# Patient Record
Sex: MALE | Race: WHITE | NOT HISPANIC OR LATINO | Employment: FULL TIME | URBAN - METROPOLITAN AREA
[De-identification: names, ages, dates, MRNs, and addresses within clinical notes are randomized per-mention and may not be internally consistent; named-entity substitution may affect disease eponyms.]

---

## 2017-04-12 ENCOUNTER — APPOINTMENT (OUTPATIENT)
Dept: GENERAL RADIOLOGY | Facility: CLINIC | Age: 52
End: 2017-04-12
Attending: EMERGENCY MEDICINE

## 2017-04-12 ENCOUNTER — HOSPITAL ENCOUNTER (EMERGENCY)
Facility: CLINIC | Age: 52
Discharge: HOME | End: 2017-04-12
Attending: EMERGENCY MEDICINE | Admitting: EMERGENCY MEDICINE

## 2017-04-12 VITALS
SYSTOLIC BLOOD PRESSURE: 127 MMHG | RESPIRATION RATE: 18 BRPM | DIASTOLIC BLOOD PRESSURE: 79 MMHG | TEMPERATURE: 98.2 F | HEART RATE: 79 BPM | OXYGEN SATURATION: 97 %

## 2017-04-12 DIAGNOSIS — M23.91 INTERNAL DERANGEMENT OF RIGHT KNEE: ICD-10-CM

## 2017-04-12 PROCEDURE — 99284 EMERGENCY DEPT VISIT MOD MDM: CPT | Mod: 25

## 2017-04-12 PROCEDURE — 25000132 ZZH RX MED GY IP 250 OP 250 PS 637: Performed by: EMERGENCY MEDICINE

## 2017-04-12 PROCEDURE — 99284 EMERGENCY DEPT VISIT MOD MDM: CPT | Mod: Z6 | Performed by: EMERGENCY MEDICINE

## 2017-04-12 PROCEDURE — 73560 X-RAY EXAM OF KNEE 1 OR 2: CPT | Mod: RT

## 2017-04-12 PROCEDURE — 29505 APPLICATION LONG LEG SPLINT: CPT | Mod: RT

## 2017-04-12 RX ORDER — HYDROCODONE BITARTRATE AND ACETAMINOPHEN 5; 325 MG/1; MG/1
1-2 TABLET ORAL EVERY 6 HOURS PRN
Qty: 15 TABLET | Refills: 0 | Status: SHIPPED | OUTPATIENT
Start: 2017-04-12 | End: 2022-06-16

## 2017-04-12 RX ORDER — HYDROCODONE BITARTRATE AND ACETAMINOPHEN 5; 325 MG/1; MG/1
2 TABLET ORAL ONCE
Status: COMPLETED | OUTPATIENT
Start: 2017-04-12 | End: 2017-04-12

## 2017-04-12 RX ADMIN — HYDROCODONE BITARTRATE AND ACETAMINOPHEN 2 TABLET: 5; 325 TABLET ORAL at 22:08

## 2017-04-12 ASSESSMENT — ENCOUNTER SYMPTOMS
SHORTNESS OF BREATH: 0
FEVER: 0
ABDOMINAL PAIN: 0

## 2017-04-12 NOTE — ED AVS SNAPSHOT
[facility], Emergency Department    [address]    [facility] [address]    Phone:  [phone]    Fax:  [phone]                                       Alvarez Subramanian   MRN: [id number]    Department:  [facility], Emergency Department   Date of Visit:  4/12/2017           Patient Information     Date Of Birth          1965        Your diagnoses for this visit were:     Internal derangement of right knee        You were seen by Patrica Montgomery MD.        Discharge Instructions         How Your Knee Works  A healthy knee bends easily and rotates slightly. The joint absorbs stress and moves smoothly. This allows you to walk, squat, and turn without pain.    A healthy knee  The knee is a hinge joint, formed where the thighbone (femur) and the shinbone (tibia) meet. It is the largest joint in the body. The joint is covered with smooth tissue and powered by large muscles. When all the parts listed below are healthy, a knee should move easily:    Cartilage is a layer of smooth tissue. It covers the ends of the thighbone and shinbone. It also lines the back side of the kneecap. Healthy cartilage absorbs stress and allows the knee to bend easily.    Muscles power the knee and leg for movement.    Tendons attach the muscles to the bones.    Ligaments are bands of tissue that connect bones and brace the joint.    Bones that make up your knee joint include your thighbone (femur), shinbone (tibia), and kneecap (patella).    Menisci are 2 wedge shaped pieces of cartilage that absorb shock between the thighbone and shinbone.    8766-2934 [email or web address]. [address] [place] [address], [place] [address]. All rights reserved. This information is not intended as a substitute for professional medical care. Always follow your healthcare professional's instructions.      Please make an appointment to follow up with Orthopedics (phone: [phone])within a week.       24 Hour Appointment Hotline        To make an appointment at any [facility] clinic, call [phone] ([phone]). If you don't have a family doctor or clinic, we will help you find one. [facility] clinics are conveniently located to serve the needs of you and your family.          ED Discharge Orders     Crutches       Use gait belt during crutch training.            Knee immobilizer                    Review of your medicines      START taking        Dose / Directions Last dose taken    HYDROcodone-acetaminophen 5-325 MG per tablet   Commonly known as:  NORCO   Dose:  1-2 tablet   Quantity:  15 tablet        Take 1-2 tablets by mouth every 6 hours as needed for moderate to severe pain   Refills:  0          Our records show that you are taking the medicines listed below. If these are incorrect, please call your family doctor or clinic.        Dose / Directions Last dose taken    IBUPROFEN PO        Refills:  0        SYNTHROID PO   Dose:  175 mcg        Take 175 mcg by mouth   Refills:  0                Prescriptions were sent or printed at these locations (1 Prescription)                   Other Prescriptions                Printed at Department/Unit printer (1 of 1)         HYDROcodone-acetaminophen (NORCO) 5-325 MG per tablet                Procedures and tests performed during your visit     Knee XR, 1-2 views, right      Orders Needing Specimen Collection     None      Pending Results     No orders found from 4/10/2017 to 4/13/2017.            Pending Culture Results     No orders found from 4/10/2017 to 4/13/2017.            Thank you for choosing [facility]       Thank you for choosing [facility] for your care. Our goal is always to provide you with excellent care. Hearing back from our patients is one way we can continue to improve our services. Please take a few minutes to complete the written survey that you may receive in the mail after you visit with us. Thank you!        [employer] Information     [employer] lets you send messages to your  "doctor, view your test results, renew your prescriptions, schedule appointments and more. To sign up, go to [email or web address] . Click on \"Log in\" on the left side of the screen, which will take you to the Welcome page. Then click on \"Sign up Now\" on the right side of the page.     You will be asked to enter the access code listed below, as well as some personal information. Please follow the directions to create your username and password.     Your access code is: [email or web address]  Expires: 2017 10:13 PM     Your access code will  in 90 days. If you need help or a new code, please call your [place] clinic or [phone].        Care EveryWhere ID     This is your Care EveryWhere ID. This could be used by other organizations to access your [place] medical records  [id number]        After Visit Summary       This is your record. Keep this with you and show to your community pharmacist(s) and doctor(s) at your next visit.                  "

## 2017-04-13 ENCOUNTER — OFFICE VISIT (OUTPATIENT)
Dept: ORTHOPEDICS | Facility: CLINIC | Age: 52
End: 2017-04-13

## 2017-04-13 VITALS — HEIGHT: 68 IN | WEIGHT: 212 LBS | BODY MASS INDEX: 32.13 KG/M2

## 2017-04-13 DIAGNOSIS — M25.561 ACUTE PAIN OF RIGHT KNEE: Primary | ICD-10-CM

## 2017-04-13 NOTE — DISCHARGE INSTRUCTIONS
How Your Knee Works  A healthy knee bends easily and rotates slightly. The joint absorbs stress and moves smoothly. This allows you to walk, squat, and turn without pain.    A healthy knee  The knee is a hinge joint, formed where the thighbone (femur) and the shinbone (tibia) meet. It is the largest joint in the body. The joint is covered with smooth tissue and powered by large muscles. When all the parts listed below are healthy, a knee should move easily:    Cartilage is a layer of smooth tissue. It covers the ends of the thighbone and shinbone. It also lines the back side of the kneecap. Healthy cartilage absorbs stress and allows the knee to bend easily.    Muscles power the knee and leg for movement.    Tendons attach the muscles to the bones.    Ligaments are bands of tissue that connect bones and brace the joint.    Bones that make up your knee joint include your thighbone (femur), shinbone (tibia), and kneecap (patella).    Menisci are 2 wedge shaped pieces of cartilage that absorb shock between the thighbone and shinbone.    0406-0688 [employer]. [address] [place] [address]. All rights reserved. This information is not intended as a substitute for professional medical care. Always follow your healthcare professional's instructions.      Please make an appointment to follow up with Orthopedics (phone: [phone])within a week.

## 2017-04-13 NOTE — MR AVS SNAPSHOT
After Visit Summary   4/13/2017    Alvarez Subramanian    MRN: [id number]           Patient Information     Date Of Birth          1965        Visit Information        Provider Department      4/13/2017 2:00 PM Kina Hinojosa MD [facility] Sports Medicine        Today's Diagnoses     Acute pain of right knee    -  1       Follow-ups after your visit        Follow-up notes from your care team     Return for MRI review.      Your next 10 appointments already scheduled     Apr 18, 2017  7:45 PM CDT   (Arrive by 7:30 PM)   MR LOWER EXTREMITY JOINT RIGHT W/O CONTRAST with [id number]   [facility] [place] [facility] ([facility])    [address]  1st Floor  [facility] [address] [phone]           Take your medicines as usual, unless your doctor tells you not to. Bring a list of your current medicines to your exam (including vitamins, minerals and over-the-counter drugs). Also bring the results of similar scans you may have had.  Please remove any body piercings and hair extensions before you arrive.  Follow your doctor s orders. If you do not, we may have to postpone your exam.  You will not have contrast for this exam. You do not need to do anything special to prepare.  The MRI machine uses a strong magnet. Please wear clothes without metal (snaps, zippers). A sweatsuit works well, or we may give you a hospital gown.   **IMPORTANT** THE INSTRUCTIONS BELOW ARE ONLY FOR THOSE PATIENTS WHO HAVE BEEN TOLD THEY WILL RECEIVE SEDATION OR GENERAL ANESTHESIA DURING THEIR MRI PROCEDURE:  IF YOU WILL RECEIVE SEDATION (take medicine to help you relax during your exam):   You must get the medicine from your doctor before you arrive. Bring the medicine to the exam. Do not take it at home.   Arrive one hour early. Bring someone who can take you home after the test. Your medicine will make you sleepy. After the exam, you may not drive, take a bus or take a taxi by yourself.   No  eating 8 hours before your exam. You may have clear liquids up until 4 hours before your exam. (Clear liquids include water, clear tea, black coffee and fruit juice without pulp.)  IF YOU WILL RECEIVE ANESTHESIA (be asleep for your exam):   Arrive 1 1/2 hours early. Bring someone who can take you home after the test. You may not drive, take a bus or take a taxi by yourself.   No eating 8 hours before your exam. You may have clear liquids up until 4 hours before your exam. (Clear liquids include water, clear tea, black coffee and fruit juice without pulp.)   You will spend four to five hours in the recovery room.  Please call the Imaging Department at your exam site with any questions.              Future tests that were ordered for you today     Open Future Orders        Priority Expected Expires Ordered    MRI Lower extremity joint w/o contrast rt* Routine  4/13/2018 4/13/2017            Who to contact     Please call your clinic at [phone] to:    Ask questions about your health    Make or cancel appointments    Discuss your medicines    Learn about your test results    Speak to your doctor   If you have compliments or concerns about an experience at your clinic, or if you wish to file a complaint, please contact [facility] Patient Relations at [phone] or email us at [email or web address]         Additional Information About Your Visit        [employer] Information     [employer] is an electronic gateway that provides easy, online access to your medical records. With [employer], you can request a clinic appointment, read your test results, renew a prescription or communicate with your care team.     To sign up for [employer] visit the website at [email or web address]   You will be asked to enter the access code listed below, as well as some personal information. Please follow the directions to create your username and password.     Your access code is: [email or web address]  Expires:  "2017 10:13 PM     Your access code will  in 90 days. If you need help or a new code, please contact your [facility] or call [phone] for assistance.        Care EveryWhere ID     This is your Care EveryWhere ID. This could be used by other organizations to access your [place] medical records  [id number]        Your Vitals Were     Height BMI (Body Mass Index)                5' 8\" (1.727 m) 32.23 kg/m2           Blood Pressure from Last 3 Encounters:   17 127/79    Weight from Last 3 Encounters:   17 212 lb (96.2 kg)               Primary Care Provider Office Phone # Fax #    [facility] [phone]       [address] [place] [address] [address]  [facility] [address]        Thank you!     Thank you for choosing [facility]  for your care. Our goal is always to provide you with excellent care. Hearing back from our patients is one way we can continue to improve our services. Please take a few minutes to complete the written survey that you may receive in the mail after your visit with us. Thank you!             Your Updated Medication List - Protect others around you: Learn how to safely use, store and throw away your medicines at www.disposemymeds.org.          This list is accurate as of: 17  2:37 PM.  Always use your most recent med list.                   Brand Name Dispense Instructions for use    HYDROcodone-acetaminophen 5-325 MG per tablet    NORCO    15 tablet    Take 1-2 tablets by mouth every 6 hours as needed for moderate to severe pain       IBUPROFEN PO          SYNTHROID PO      Take 175 mcg by mouth         "

## 2017-04-13 NOTE — PROGRESS NOTES
Attending Note:   I have personally examined this patient and have reviewed the clinical presentation and progress note with the fellow. I agree with the treatment plan as outlined. The plan was formulated with the fellow on the day of the patient's visit. I have reviewed all imaging with the fellow and agree with the findings in the documentation.     Maricarmen Daniels MD, CAQ, CCD  [facility]  Sports Medicine and Bone Health

## 2017-04-13 NOTE — PROGRESS NOTES
" Subjective:   Alvarez Subramanian is a 52 year old male who is here for right knee pain.  He states that 3 days ago on Monday he was standing on a ladder about 2 feet off the ground)  when his foot slipped and he fell.  His right leg twisted outward during the fall.  He had significant medial knee pain and developed an effusion over the next 2 hours.  His pain is worst with twisting.  He has taken ibuprofen and Norco for pain.  He is wearing his immobilizer, using crutches, and icing/ elevating his knee..  He is unable to bear weight on his knee currently.  He denies any previous problems with his knees.  He works as an independent .  Denies numbness or tingling.      Background:   Date of injury: 4/10/17   Duration of symptoms: 3 days  Mechanism of Injury: Acute; fall on ladder, knee twisted    Aggravating factors: twisting, weight bearing  Relieving Factors: rest and ice  Prior Evaluation: Prior Physician Evalutation: ED and X-rays    PAST MEDICAL, SOCIAL, SURGICAL AND FAMILY HISTORY: He  has no past medical history on file.  He  has no past surgical history on file.  His family history is not on file.  He     ALLERGIES: He has No Known Allergies.    CURRENT MEDICATIONS: He has a current medication list which includes the following prescription(s): ibuprofen, levothyroxine sodium, and hydrocodone-acetaminophen.     REVIEW OF SYSTEMS:  CONSTITUTIONAL:NEGATIVE for fever, chills, change in weight  INTEGUMENTARY/SKIN: NEGATIVE for worrisome rashes, moles or lesions  MUSCULOSKELETAL:See HPI above  NEURO: NEGATIVE for weakness, dizziness or paresthesias  All other systems reviewed and negative.       Exam:   Ht 5' 8\" (1.727 m)  Wt 212 lb (96.2 kg)  BMI 32.23 kg/m2  CONSTITUTIONIAL: healthy, alert and no distress  HEENT: NC/AT, no scleral icterus  SKIN: no suspicious lesions or rashes  GAIT: normal  CARDIO: no LE edema  LUNGS: breathing non labored  ABD: soft, non obese  NEUROLOGIC: No focal neuro " deficits  PSYCHIATRIC: affect normal/bright and mentation appears normal    MUSCULOSKELETAL:   R knee: significant effusion, no erythema or bruising, medial joint line pain, ROM 5-90, pain with flexion, mcmurrays reproduces medial joint line pain, unable to get a good end point on lachmans secondary to guarding, MCL stress increases pain without opening, negative LCL stress testing      KNEE RIGHT ONE - TWO VIEW 4/12/2017 9:38 PM      HISTORY: Pain, trauma.         IMPRESSION: Joint effusion. No fracture identified.     REGINALD BLACKBURN MD     Assessment/Plan:   Right knee pain  Internal Derangement, Effusion  -concern for ACL, medial meniscus  -MRI to further evaluate  -hinged knee brace, recommended knee ROM  -effusion control with icing, elevating  -some weight bearing with crutches until MRI review    F/u after MRI to review results    Patient seen and examined with attending physician, Dr. Daniels.    Kina Hinojosa DO  Primary Care Sports Medicine Fellow

## 2017-04-13 NOTE — ED PROVIDER NOTES
[facility] [place] [facility]    April 12, 2017    ED 19  History     Chief Complaint   Patient presents with     Knee Pain     two days ago foot slipped off the second rung of a ladder and right foot hit grass and foot moved outward. Foot is fine but medial aspect of right knee hurt a great deal at the time; denies hitting head and fell against ladder.     The history is provided by the patient.     Alvarez Subramanian is a 52 year old male who presents with knee pain. The patient reports that he was standing about 2 feet up on a ladder when his foot slipped and he fell on Monday, 2 days ago. He landed on the right leg, and notes that his right foot slipped and the lower leg twisted outward while the upper leg was straight. He complains of pain in the right knee since the fall, and developed significant swelling in the knee over the next 2-3 hours the fall. He complains of right knee swelling and pain in the right medial knee currently. He has been taking Tylenol and ibuprofen for the pain, as well as applying ice. He has been unable to ambulate on the right leg since the fall. He denies hip pain or any other injuries as a result of the fall.     No past medical history on file.    No past surgical history on file.    No family history on file.    Social History   Substance Use Topics     Smoking status: Not on file     Smokeless tobacco: Not on file     Alcohol use Not on file     No current facility-administered medications for this encounter.      Current Outpatient Prescriptions   Medication     IBUPROFEN PO     Levothyroxine Sodium (SYNTHROID PO)     HYDROcodone-acetaminophen (NORCO) 5-325 MG per tablet      No Known Allergies     I have reviewed the Medications, Allergies, Past Medical and Surgical History, and Social History in the Epic system.    Review of Systems   Constitutional: Negative for fever.   Respiratory: Negative for shortness of breath.    Cardiovascular: Negative for chest  pain.   Gastrointestinal: Negative for abdominal pain.   Musculoskeletal:        Positive for right knee pain and swelling. Negative for hip pain.   All other systems reviewed and are negative.      Physical Exam   BP: 114/72  Pulse: 79  Temp: 99.1  F (37.3  C)  Resp: 16  SpO2: 95 %  Physical Exam   Constitutional: No distress.   HENT:   Head: Atraumatic.   Eyes: No scleral icterus.   Cardiovascular: Normal heart sounds and intact distal pulses.    Pulmonary/Chest: Breath sounds normal. No respiratory distress.   Musculoskeletal: He exhibits tenderness. He exhibits no edema.        Legs:  Skin: Skin is warm. No rash noted. He is not diaphoretic.       ED Course     ED Course     Procedures     9:05 PM  The patient was seen and examined by Dr. Montgomery in Room 19.               Critical Care time:  none               Labs Ordered and Resulted from Time of ED Arrival Up to the Time of Departure from the ED - No data to display    Assessments & Plan (with Medical Decision Making)   Patient does have a notable effusion.  He has no significant tenderness on palpation, though given the significant effusion and the inability to bear weight, x-ray was done.  This does not show any bony abnormality.  He does have a lot of medial discomfort with medial stress.  I do think he likely has internal derangement of the knee.  He was given a small number of Vicodin to be used as needed for pain, as well as crutches and an immobilizer.  He is instructed to follow up with Orthopedics or primary care physician. Patient verbalizes understanding and is agreeable to plan.    This part of the document was transcribed by Williams Julien Scribe.      I have reviewed the nursing notes.    I have reviewed the findings, diagnosis, plan and need for follow up with the patient.    Discharge Medication List as of 4/12/2017 10:13 PM      START taking these medications    Details   HYDROcodone-acetaminophen (NORCO) 5-325 MG per tablet Take 1-2  tablets by mouth every 6 hours as needed for moderate to severe pain, Disp-15 tablet, R-0, Local Print             Final diagnoses:   Internal derangement of right knee     I, Ze Lim, am serving as a trained medical scribe to document services personally performed by Patrica Montgomery MD, based on the provider's statements to me.   IPatrica MD, was physically present and have reviewed and verified the accuracy of this note documented by Ze Lim.     4/12/2017   [facility], [place], EMERGENCY DEPARTMENT     Patrica Montgomery MD  04/12/17 0962

## 2017-04-13 NOTE — LETTER
4/13/2017      RE: Alavrez Subramanian  [address] [address]       Attending Note:   I have personally examined this patient and have reviewed the clinical presentation and progress note with the fellow. I agree with the treatment plan as outlined. The plan was formulated with the fellow on the day of the patient's visit. I have reviewed all imaging with the fellow and agree with the findings in the documentation.     Maricarmen Daniels MD, CAQ, CCD  [facility]  Sports Medicine and Bone Health     Subjective:   Alvarez Subramanian is a 52 year old male who is here for right knee pain.  He states that 3 days ago on Monday he was standing on a ladder about 2 feet off the ground)  when his foot slipped and he fell.  His right leg twisted outward during the fall.  He had significant medial knee pain and developed an effusion over the next 2 hours.  His pain is worst with twisting.  He has taken ibuprofen and Norco for pain.  He is wearing his immobilizer, using crutches, and icing/ elevating his knee..  He is unable to bear weight on his knee currently.  He denies any previous problems with his knees.  He works as an independent .  Denies numbness or tingling.      Background:   Date of injury: 4/10/17   Duration of symptoms: 3 days  Mechanism of Injury: Acute; fall on ladder, knee twisted    Aggravating factors: twisting, weight bearing  Relieving Factors: rest and ice  Prior Evaluation: Prior Physician Evalutation: ED and X-rays    PAST MEDICAL, SOCIAL, SURGICAL AND FAMILY HISTORY: He  has no past medical history on file.  He  has no past surgical history on file.  His family history is not on file.  He     ALLERGIES: He has No Known Allergies.    CURRENT MEDICATIONS: He has a current medication list which includes the following prescription(s): ibuprofen, levothyroxine sodium, and hydrocodone-acetaminophen.     REVIEW OF SYSTEMS:  CONSTITUTIONAL:NEGATIVE for fever, chills, change in  "weight  INTEGUMENTARY/SKIN: NEGATIVE for worrisome rashes, moles or lesions  MUSCULOSKELETAL:See HPI above  NEURO: NEGATIVE for weakness, dizziness or paresthesias  All other systems reviewed and negative.       Exam:   Ht 5' 8\" (1.727 m)  Wt 212 lb (96.2 kg)  BMI 32.23 kg/m2  CONSTITUTIONIAL: healthy, alert and no distress  HEENT: NC/AT, no scleral icterus  SKIN: no suspicious lesions or rashes  GAIT: normal  CARDIO: no LE edema  LUNGS: breathing non labored  ABD: soft, non obese  NEUROLOGIC: No focal neuro deficits  PSYCHIATRIC: affect normal/bright and mentation appears normal    MUSCULOSKELETAL:   R knee: significant effusion, no erythema or bruising, medial joint line pain, ROM 5-90, pain with flexion, mcmurrays reproduces medial joint line pain, unable to get a good end point on lachmans secondary to guarding, MCL stress increases pain without opening, negative LCL stress testing      KNEE RIGHT ONE - TWO VIEW 4/12/2017 9:38 PM      HISTORY: Pain, trauma.         IMPRESSION: Joint effusion. No fracture identified.     REGINALD BLACKBURN MD     Assessment/Plan:   Right knee pain  Internal Derangement, Effusion  -concern for ACL, medial meniscus  -MRI to further evaluate  -hinged knee brace, recommended knee ROM  -effusion control with icing, elevating  -some weight bearing with crutches until MRI review    F/u after MRI to review results    Patient seen and examined with attending physician, Dr. Daniels.    Kina Hinojosa DO  Primary Care Sports Medicine Fellow        "

## 2021-06-21 NOTE — ED AVS SNAPSHOT
[facility], [place], Emergency Department    [address]    [facility] [address]    Phone:  [phone]    Fax:  [phone]                                       Alvarez Subramanian   MRN: [id number]    Department:  [facility], Emergency Department   Date of Visit:  4/12/2017           After Visit Summary Signature Page     I have received my discharge instructions, and my questions have been answered. I have discussed any challenges I see with this plan with the nurse or doctor.    ..........................................................................................................................................  Patient/Patient Representative Signature      ..........................................................................................................................................  Patient Representative Print Name and Relationship to Patient    ..................................................               ................................................  Date                                            Time    ..........................................................................................................................................  Reviewed by Signature/Title    ...................................................              ..............................................  Date                                                            Time           yes

## 2022-06-15 ENCOUNTER — TELEPHONE (OUTPATIENT)
Dept: FAMILY MEDICINE | Facility: CLINIC | Age: 57
End: 2022-06-15
Payer: COMMERCIAL

## 2022-06-15 NOTE — TELEPHONE ENCOUNTER
This pt. Is on for new pt. establish care, video visit. Needs to be in-person visit. Please re-schedule or notify him to come in person whatever needed. Not appropriate for video visit.   MARTINA Thrasher CNP

## 2022-06-16 ENCOUNTER — OFFICE VISIT (OUTPATIENT)
Dept: FAMILY MEDICINE | Facility: CLINIC | Age: 57
End: 2022-06-16
Payer: COMMERCIAL

## 2022-06-16 DIAGNOSIS — N48.6 PEYRONIE DISEASE: Primary | ICD-10-CM

## 2022-06-16 DIAGNOSIS — R79.89 LOW TESTOSTERONE IN MALE: ICD-10-CM

## 2022-06-16 DIAGNOSIS — E03.9 HYPOTHYROIDISM, UNSPECIFIED TYPE: ICD-10-CM

## 2022-06-16 PROBLEM — E03.8 OTHER SPECIFIED HYPOTHYROIDISM: Status: ACTIVE | Noted: 2022-06-16

## 2022-06-16 PROCEDURE — 99214 OFFICE O/P EST MOD 30 MIN: CPT | Mod: TEL | Performed by: NURSE PRACTITIONER

## 2022-06-16 RX ORDER — PENTOXIFYLLINE 400 MG/1
400 TABLET, EXTENDED RELEASE ORAL
Qty: 90 TABLET | Refills: 0 | Status: SHIPPED | OUTPATIENT
Start: 2022-06-16 | End: 2022-07-06

## 2022-06-16 RX ORDER — THYROID 120 MG/1
120 TABLET ORAL DAILY
Qty: 30 TABLET | Refills: 0 | Status: SHIPPED | OUTPATIENT
Start: 2022-06-16 | End: 2022-07-06

## 2022-06-16 RX ORDER — LEVOTHYROXINE SODIUM 175 UG/1
175 TABLET ORAL DAILY
Qty: 90 TABLET | Refills: 0 | Status: SHIPPED | OUTPATIENT
Start: 2022-06-16 | End: 2022-06-16

## 2022-06-16 NOTE — PROGRESS NOTES
Assessment & Plan     Peyronie disease  On [place], needs f/u eval. 1 month given. Referral sent.   - pentoxifylline ER (TRENTAL) 400 MG CR tablet; Take 1 tablet (400 mg) by mouth 3 times daily (with meals)  - Adult Urology Referral; Future    Hypothyroidism, unspecified type  Likely stable, last labs in Nov.   Needs re-check. Pt. Will have labs with PE when established.   - thyroid (ARMOUR) 120 MG tablet; Take 1 tablet (120 mg) by mouth daily    Low testosterone in male  Urology eval needed, low energy.   - Adult Urology Referral; Future             MEDICATIONS:  Continue current medications without change    Return in about 4 weeks (around 7/14/2022) for Physical Exam.      Telephone visit 26 minutes.     MARTINA Thrasher CNP  [facility] JUSTIN Uribe is a 57 year old, presenting for the following health issues:  No chief complaint on file.  establish care. [facility] system, used for past 6 months.     TX for Peyronie's Syndrome with pentoxifylline TID, onset in November very painful.    Needs to have urology referral. HX of testosterone levels being low, had implant therapy in his buttocks.     Hypothyroidism- diagnosed 7 years ago. switched to [place] thyroid about 6 months ago, due to hair loss. This has helped, and hair loss is better, but low level fatigue.   Pt. Felt that Gatorade consumption/deyhydration caused the thyroid issues over time.     Past, social, family history reviewed.       significant other, has two children 26- son, daughter 18.     HPI           Review of Systems   Constitutional, HEENT, cardiovascular, pulmonary, gi and gu systems are negative, except as otherwise noted.      Objective    There were no vitals taken for this visit.  There is no height or weight on file to calculate BMI.  Physical Exam   healthy, alert and no distress  PSYCH: Alert and oriented times 3; coherent speech, normal   rate and volume, able to articulate logical thoughts,  able   to abstract reason, no tangential thoughts, no hallucinations   or delusions  Her affect is normal and pleasant  RESP: No cough, no audible wheezing, able to talk in full sentences  Remainder of exam unable to be completed due to telephone visits                   .  ..

## 2022-07-05 NOTE — PROGRESS NOTES
SUBJECTIVE:   CC: Alvarez Subramanian is an 57 year old male who presents for preventative health visit.       Patient has been advised of split billing requirements and indicates understanding: Yes  Healthy Habits:     Getting at least 3 servings of Calcium per day:  NO    Bi-annual eye exam:  NO    Dental care twice a year:  NO    Sleep apnea or symptoms of sleep apnea:  None    Diet:  Regular (no restrictions) and Breakfast skipped    Frequency of exercise:  None    Taking medications regularly:  Yes    Medication side effects:  None    PHQ-2 Total Score: 2    Additional concerns today:  No      Today's PHQ-2 Score:   PHQ-2 ( 1999 Pfizer) 7/6/2022   Q1: Little interest or pleasure in doing things 1   Q2: Feeling down, depressed or hopeless 1   PHQ-2 Score 2   Q1: Little interest or pleasure in doing things Several days   Q2: Feeling down, depressed or hopeless Several days   PHQ-2 Score 2       Abuse: Current or Past(Physical, Sexual or Emotional)- No  Do you feel safe in your environment? Yes    Have you ever done Advance Care Planning? (For example, a Health Directive, POLST, or a discussion with a medical provider or your loved ones about your wishes): No, advance care planning information given to patient to review.  Advanced care planning was discussed at today's visit.    Social History     Tobacco Use     Smoking status: Former Smoker     Packs/day: 1.00     Years: 10.00     Pack years: 10.00     Types: Cigarettes     Smokeless tobacco: Never Used   Substance Use Topics     Alcohol use: Yes     Comment: 2 drinks/week         Alcohol Use 7/6/2022   Prescreen: >3 drinks/day or >7 drinks/week? No       Last PSA: No results found for: PSA    Reviewed orders with patient. Reviewed health maintenance and updated orders accordingly - Yes  Current Outpatient Medications   Medication Sig Dispense Refill     aspirin (ASA) 81 MG chewable tablet Take 81 mg by mouth daily       IBUPROFEN PO        pentoxifylline ER  (TRENTAL) 400 MG CR tablet Take 1 tablet (400 mg) by mouth 3 times daily (with meals) 90 tablet 0     thyroid (ARMOUR) 120 MG tablet Take 1 tablet (120 mg) by mouth daily 30 tablet 0     No Known Allergies    Reviewed and updated as needed this visit by clinical staff   Tobacco  Allergies  Meds   Med Hx  Surg Hx  Fam Hx  Soc Hx          Reviewed and updated as needed this visit by Provider                      Here for physical and to establish care.  Needs have refills of medications.    Had been receiving health care through the [facility] system in [place].  Recently moved to [facility] and now is on girlfriends insurance, she is a nurse at the [employer] and needs to establish care in this area.  Has a history of low testosterone.  Reports had testosterone pellet implant therapy into buttocks.  Approximately 6 months after having them placed developed peyronies syndrome.  Shins are very painful.  Currently taking pentoxifylline 3 times daily.  Does have appointment scheduled with urologist but needs refill until can be seen.    History of hypothyroidism, diagnosed 7 years ago.  Had been taking levothyroxine but switched to [place] Thyroid about 6 months ago due to continued hair loss.  [place] Thyroid has helped and hair loss has decreased.  Continues to be fatigued.  Uncertain if this is related to thyroid issues or low testosterone.    Has never had a colonoscopy.  Father did have colostomy bag.  Uncertain why he had colostomy.  Does have an external hemorrhoid.  Has had it for about 15 years.  Will have bright red blood in stool at times. Comes and goes.     Had COVID in April of 2021    Has been sometime since was at dentist, 15+ years ago.  Previous dental experience has been poor.  Reports had dental abscess drained without any sort of numbing medication.  Caused excruciating pain and lost trust in dental professionals.  Admittedly has poor dentition, brushes teeth every other day.   "Receding gums.    Last PSA: No family history   Last Colonoscopy: Never, father with colostomy-unsure of reason why  Last eye exam: long time ago  Last dental exam: 15 years ago  Last tetanus vaccine: 2017  Last influenza vaccine: Declines   Last shingles vaccine: Never  Last pneumonia vaccine: Not applicable  Last COVID vaccine: Declines  Last COVID booster: Not applicable  Hep C screen: We will do here today  HIV screen: We will do here today  AAA screen (age 65-75 with smoking hx): Not applicable  IVD (HTN, Hyperlipid, Smoking): Not applicable  Lung CA screening (50-80, 20 pk smoking hx) Quit within 15 years: smoked for 6-8 years. Quit at age 33      Review of Systems   Constitutional: Positive for fatigue. Negative for chills, diaphoresis and fever.   HENT: Negative for congestion, ear pain, hearing loss, postnasal drip, rhinorrhea, sinus pressure and sore throat.    Eyes: Positive for visual disturbance. Negative for pain and discharge.   Respiratory: Positive for shortness of breath (occ with sexual activity ). Negative for cough, chest tightness and wheezing.    Cardiovascular: Negative for chest pain, palpitations and peripheral edema.   Gastrointestinal: Positive for nausea. Negative for abdominal pain, constipation, diarrhea, heartburn, hematochezia and vomiting.   Genitourinary: Positive for impotence. Negative for dysuria, frequency, genital sores, hematuria, penile discharge and urgency.   Musculoskeletal: Negative for arthralgias, joint swelling and myalgias.   Skin: Negative for rash.   Neurological: Negative for dizziness, weakness, light-headedness, numbness, headaches and paresthesias.   Hematological: Does not bruise/bleed easily.   Psychiatric/Behavioral: Negative for confusion and mood changes. The patient is not nervous/anxious.        OBJECTIVE:   /81   Pulse 56   Temp 97.4  F (36.3  C) (Tympanic)   Resp 24   Ht 1.695 m (5' 6.75\")   Wt 92.3 kg (203 lb 6.4 oz)   SpO2 97%   BMI " 32.10 kg/m      Physical Exam  Constitutional:       Appearance: He is well-developed.   HENT:      Right Ear: Tympanic membrane and external ear normal. No middle ear effusion. Tympanic membrane is not erythematous.      Left Ear: Tympanic membrane and external ear normal.  No middle ear effusion. Tympanic membrane is not erythematous.      Mouth/Throat:      Pharynx: Uvula midline.   Eyes:      Pupils: Pupils are equal, round, and reactive to light.   Neck:      Thyroid: No thyromegaly.      Vascular: No carotid bruit.   Cardiovascular:      Rate and Rhythm: Normal rate and regular rhythm.      Pulses:           Femoral pulses are 2+ on the right side and 2+ on the left side.     Heart sounds: Normal heart sounds.   Pulmonary:      Effort: Pulmonary effort is normal.      Breath sounds: Normal breath sounds.   Abdominal:      General: Bowel sounds are normal. There is no distension.      Palpations: Abdomen is soft.      Tenderness: There is no abdominal tenderness.   Musculoskeletal:         General: Normal range of motion.   Skin:     General: Skin is warm and dry.   Neurological:      Mental Status: He is alert.         ASSESSMENT/PLAN:   Screen for colon cancer  - Colonscopy Screening  Referral; Future    Screening for HIV (human immunodeficiency virus)  - HIV Antigen Antibody Combo; Future  - HIV Antigen Antibody Combo    Need for hepatitis C screening test  - Hepatitis C Screen Reflex to HCV RNA Quant and Genotype; Future  - Hepatitis C Screen Reflex to HCV RNA Quant and Genotype    Screening for hyperlipidemia  - Lipid panel reflex to direct LDL Fasting; Future  - Lipid panel reflex to direct LDL Fasting    Routine general medical examination at a health care facility  Screening guidelines reviewed.   - REVIEW OF HEALTH MAINTENANCE PROTOCOL ORDERS  - Adult Eye Referral; Future    Hypothyroidism, unspecified type  We will recheck thyroid here today.  - thyroid (ARMOUR) 120 MG tablet; Take 1 tablet  "(120 mg) by mouth daily  - TSH with free T4 reflex; Future  - TSH with free T4 reflex    Screening for diabetes mellitus  - Basic metabolic panel; Future  - Basic metabolic panel    Fatigue, unspecified type  Will check thyroid.  Consider sleep evaluation in the future    Low testosterone in male  Has appointment scheduled with urology to address    Peyronie's syndrome  Has appointment scheduled with urology.  Refill given until can be seen.  - pentoxifylline ER (TRENTAL) 400 MG CR tablet; Take 1 tablet (400 mg) by mouth 3 times daily (with meals)    Patient has been advised of split billing requirements and indicates understanding: Yes    COUNSELING:   Reviewed preventive health counseling, as reflected in patient instructions       Regular exercise       Healthy diet/nutrition       Vision screening       Immunizations    Encouraged to go to pharmacy for shingles vaccine.  Declines COVID-vaccine           Consider Hep C screening for all patients one time for ages 18-79 years       HIV screeninx in teen years, 1x in adult years, and at intervals if high risk       Colorectal cancer screening       Prostate cancer screening    Estimated body mass index is 32.1 kg/m  as calculated from the following:    Height as of this encounter: 1.695 m (5' 6.75\").    Weight as of this encounter: 92.3 kg (203 lb 6.4 oz).         He reports that he has quit smoking. His smoking use included cigarettes. He has a 10.00 pack-year smoking history. He has never used smokeless tobacco.      Counseling Resources:  ATP IV Guidelines  Pooled Cohorts Equation Calculator  FRAX Risk Assessment  ICSI Preventive Guidelines  Dietary Guidelines for Americans,   USDA's MyPlate  ASA Prophylaxis  Lung CA Screening    MARTINA Diane [facility] JESSICA  "

## 2022-07-05 NOTE — PATIENT INSTRUCTIONS
Please make appointment to see dentist.     Please go to pharmacy for shingles vaccine.     Preventive Health Recommendations  Male Ages 50 - 64    Yearly exam:             See your health care provider every year in order to  o   Review health changes.   o   Discuss preventive care.    o   Review your medicines if your doctor has prescribed any.   Have a cholesterol test every 5 years, or more frequently if you are at risk for high cholesterol/heart disease.   Have a diabetes test (fasting glucose) every three years. If you are at risk for diabetes, you should have this test more often.   Have a colonoscopy at age 50, or have a yearly FIT test (stool test). These exams will check for colon cancer.    Talk with your health care provider about whether or not a prostate cancer screening test (PSA) is right for you.  You should be tested each year for STDs (sexually transmitted diseases), if you re at risk.     Shots: Get a flu shot each year. Get a tetanus shot every 10 years.     Nutrition:  Eat at least 5 servings of fruits and vegetables daily.   Eat whole-grain bread, whole-wheat pasta and brown rice instead of white grains and rice.   Get adequate Calcium and Vitamin D.     Lifestyle  Exercise for at least 150 minutes a week (30 minutes a day, 5 days a week). This will help you control your weight and prevent disease.   Limit alcohol to one drink per day.   No smoking.   Wear sunscreen to prevent skin cancer.   See your dentist every six months for an exam and cleaning.   See your eye doctor every 1 to 2 years.

## 2022-07-06 ENCOUNTER — OFFICE VISIT (OUTPATIENT)
Dept: FAMILY MEDICINE | Facility: CLINIC | Age: 57
End: 2022-07-06
Payer: COMMERCIAL

## 2022-07-06 VITALS
TEMPERATURE: 97.4 F | BODY MASS INDEX: 31.92 KG/M2 | DIASTOLIC BLOOD PRESSURE: 81 MMHG | RESPIRATION RATE: 24 BRPM | SYSTOLIC BLOOD PRESSURE: 128 MMHG | WEIGHT: 203.4 LBS | HEIGHT: 67 IN | HEART RATE: 56 BPM | OXYGEN SATURATION: 97 %

## 2022-07-06 DIAGNOSIS — Z12.11 SCREEN FOR COLON CANCER: ICD-10-CM

## 2022-07-06 DIAGNOSIS — R79.89 LOW TESTOSTERONE IN MALE: ICD-10-CM

## 2022-07-06 DIAGNOSIS — Z11.59 NEED FOR HEPATITIS C SCREENING TEST: ICD-10-CM

## 2022-07-06 DIAGNOSIS — Z00.00 ROUTINE GENERAL MEDICAL EXAMINATION AT A HEALTH CARE FACILITY: ICD-10-CM

## 2022-07-06 DIAGNOSIS — N48.6 PEYRONIE'S SYNDROME: ICD-10-CM

## 2022-07-06 DIAGNOSIS — R53.83 FATIGUE, UNSPECIFIED TYPE: ICD-10-CM

## 2022-07-06 DIAGNOSIS — Z11.4 SCREENING FOR HIV (HUMAN IMMUNODEFICIENCY VIRUS): ICD-10-CM

## 2022-07-06 DIAGNOSIS — Z13.220 SCREENING FOR HYPERLIPIDEMIA: ICD-10-CM

## 2022-07-06 DIAGNOSIS — E03.9 HYPOTHYROIDISM, UNSPECIFIED TYPE: ICD-10-CM

## 2022-07-06 DIAGNOSIS — Z13.1 SCREENING FOR DIABETES MELLITUS: Primary | ICD-10-CM

## 2022-07-06 LAB
ANION GAP SERPL CALCULATED.3IONS-SCNC: 3 MMOL/L (ref 3–14)
BUN SERPL-MCNC: 16 MG/DL (ref 7–30)
CALCIUM SERPL-MCNC: 9.5 MG/DL (ref 8.5–10.1)
CHLORIDE BLD-SCNC: 107 MMOL/L (ref 94–109)
CHOLEST SERPL-MCNC: 153 MG/DL
CO2 SERPL-SCNC: 29 MMOL/L (ref 20–32)
CREAT SERPL-MCNC: 1.22 MG/DL (ref 0.66–1.25)
FASTING STATUS PATIENT QL REPORTED: YES
GFR SERPL CREATININE-BSD FRML MDRD: 69 ML/MIN/1.73M2
GLUCOSE BLD-MCNC: 109 MG/DL (ref 70–99)
HDLC SERPL-MCNC: 58 MG/DL
LDLC SERPL CALC-MCNC: 76 MG/DL
NONHDLC SERPL-MCNC: 95 MG/DL
POTASSIUM BLD-SCNC: 5.1 MMOL/L (ref 3.4–5.3)
SODIUM SERPL-SCNC: 139 MMOL/L (ref 133–144)
T4 FREE SERPL-MCNC: 0.64 NG/DL (ref 0.76–1.46)
TRIGL SERPL-MCNC: 96 MG/DL
TSH SERPL DL<=0.005 MIU/L-ACNC: 24.84 MU/L (ref 0.4–4)

## 2022-07-06 PROCEDURE — 86803 HEPATITIS C AB TEST: CPT | Performed by: NURSE PRACTITIONER

## 2022-07-06 PROCEDURE — 87389 HIV-1 AG W/HIV-1&-2 AB AG IA: CPT | Performed by: NURSE PRACTITIONER

## 2022-07-06 PROCEDURE — 84443 ASSAY THYROID STIM HORMONE: CPT | Performed by: NURSE PRACTITIONER

## 2022-07-06 PROCEDURE — 36415 COLL VENOUS BLD VENIPUNCTURE: CPT | Performed by: NURSE PRACTITIONER

## 2022-07-06 PROCEDURE — 84439 ASSAY OF FREE THYROXINE: CPT | Performed by: NURSE PRACTITIONER

## 2022-07-06 PROCEDURE — 80048 BASIC METABOLIC PNL TOTAL CA: CPT | Performed by: NURSE PRACTITIONER

## 2022-07-06 PROCEDURE — 99214 OFFICE O/P EST MOD 30 MIN: CPT | Mod: 25 | Performed by: NURSE PRACTITIONER

## 2022-07-06 PROCEDURE — 80061 LIPID PANEL: CPT | Performed by: NURSE PRACTITIONER

## 2022-07-06 PROCEDURE — 99396 PREV VISIT EST AGE 40-64: CPT | Performed by: NURSE PRACTITIONER

## 2022-07-06 RX ORDER — THYROID 120 MG/1
120 TABLET ORAL DAILY
Qty: 30 TABLET | Refills: 0 | Status: SHIPPED | OUTPATIENT
Start: 2022-07-06 | End: 2022-07-07

## 2022-07-06 RX ORDER — ASPIRIN 81 MG/1
81 TABLET, CHEWABLE ORAL DAILY
COMMUNITY

## 2022-07-06 RX ORDER — PENTOXIFYLLINE 400 MG/1
400 TABLET, EXTENDED RELEASE ORAL
Qty: 90 TABLET | Refills: 0 | Status: SHIPPED | OUTPATIENT
Start: 2022-07-06 | End: 2022-12-01

## 2022-07-06 ASSESSMENT — ENCOUNTER SYMPTOMS
BRUISES/BLEEDS EASILY: 0
WEAKNESS: 0
HEARTBURN: 0
VOMITING: 0
HEADACHES: 0
SINUS PRESSURE: 0
NUMBNESS: 0
LIGHT-HEADEDNESS: 0
HEMATURIA: 0
DYSURIA: 0
EYE DISCHARGE: 0
MYALGIAS: 0
FEVER: 0
NERVOUS/ANXIOUS: 0
FREQUENCY: 0
NAUSEA: 1
FATIGUE: 1
DIZZINESS: 0
EYE PAIN: 0
WHEEZING: 0
ARTHRALGIAS: 1
DIAPHORESIS: 0
CHEST TIGHTNESS: 0
CHILLS: 0
COUGH: 0
CONSTIPATION: 0
SORE THROAT: 0
CONFUSION: 0
JOINT SWELLING: 0
ARTHRALGIAS: 0
HEMATOCHEZIA: 0
PARESTHESIAS: 0
RHINORRHEA: 0
PALPITATIONS: 0
SHORTNESS OF BREATH: 1
DIARRHEA: 0
ABDOMINAL PAIN: 0

## 2022-07-06 ASSESSMENT — PAIN SCALES - GENERAL: PAINLEVEL: NO PAIN (0)

## 2022-07-07 DIAGNOSIS — E03.9 HYPOTHYROIDISM, UNSPECIFIED TYPE: ICD-10-CM

## 2022-07-07 LAB
HCV AB SERPL QL IA: NONREACTIVE
HIV 1+2 AB+HIV1 P24 AG SERPL QL IA: NONREACTIVE

## 2022-08-14 ENCOUNTER — MYC MEDICAL ADVICE (OUTPATIENT)
Dept: FAMILY MEDICINE | Facility: CLINIC | Age: 57
End: 2022-08-14

## 2022-08-14 DIAGNOSIS — E03.9 HYPOTHYROIDISM, UNSPECIFIED TYPE: ICD-10-CM

## 2022-08-15 ENCOUNTER — TELEPHONE (OUTPATIENT)
Dept: FAMILY MEDICINE | Facility: CLINIC | Age: 57
End: 2022-08-15

## 2022-08-15 DIAGNOSIS — E03.9 HYPOTHYROIDISM, UNSPECIFIED TYPE: Primary | ICD-10-CM

## 2022-08-15 RX ORDER — THYROID 120 MG/1
120 TABLET ORAL DAILY
Qty: 90 TABLET | Refills: 0 | Status: SHIPPED | OUTPATIENT
Start: 2022-08-15 | End: 2022-08-29

## 2022-08-15 RX ORDER — THYROID 30 MG/1
30 TABLET ORAL DAILY
Qty: 90 TABLET | Refills: 0 | Status: SHIPPED | OUTPATIENT
Start: 2022-08-15 | End: 2022-08-29

## 2022-08-15 NOTE — TELEPHONE ENCOUNTER
Prescription approved per [facility] Refill Protocol.    Sindhu Ruiz, RN, BSN

## 2022-08-15 NOTE — TELEPHONE ENCOUNTER
Mariana from [facility] calling stating that she can not get the Thyroid 130 mg.  Can get [place] Thyroid in the 120 mg and 15 mg to equal 135 mg.  Please advise.

## 2022-08-15 NOTE — TELEPHONE ENCOUNTER
New prescription sent for thyroid 150 mg orally daily.  Previous prescription canceled.    Soni DOHERTY

## 2022-08-16 NOTE — TELEPHONE ENCOUNTER
Called [phone] (home)     Did patient answer the phone: No, left a message on voicemail to return call to the [facility] at [phone].    Aminta RN,BSN  Triage Nurse  [facility]: [facility]

## 2022-08-17 NOTE — TELEPHONE ENCOUNTER
Notified patient of the medication changes below per Patrica Rudolph, MARTINA, FNP-BC.    Patient stated understanding and agreeable with the plan of care.     Aminta RN,BSN  Triage Nurse  [facility]: [facility]

## 2022-08-29 ENCOUNTER — LAB (OUTPATIENT)
Dept: LAB | Facility: CLINIC | Age: 57
End: 2022-08-29
Payer: COMMERCIAL

## 2022-08-29 ENCOUNTER — OFFICE VISIT (OUTPATIENT)
Dept: UROLOGY | Facility: CLINIC | Age: 57
End: 2022-08-29
Attending: NURSE PRACTITIONER
Payer: COMMERCIAL

## 2022-08-29 VITALS
RESPIRATION RATE: 16 BRPM | SYSTOLIC BLOOD PRESSURE: 122 MMHG | DIASTOLIC BLOOD PRESSURE: 80 MMHG | OXYGEN SATURATION: 96 % | HEART RATE: 65 BPM

## 2022-08-29 DIAGNOSIS — R79.89 LOW TESTOSTERONE IN MALE: Primary | ICD-10-CM

## 2022-08-29 DIAGNOSIS — E03.9 HYPOTHYROIDISM, UNSPECIFIED TYPE: ICD-10-CM

## 2022-08-29 DIAGNOSIS — N48.6 PEYRONIE DISEASE: ICD-10-CM

## 2022-08-29 LAB
T4 FREE SERPL-MCNC: 0.69 NG/DL (ref 0.76–1.46)
TSH SERPL DL<=0.005 MIU/L-ACNC: 6.57 MU/L (ref 0.4–4)

## 2022-08-29 PROCEDURE — 84439 ASSAY OF FREE THYROXINE: CPT

## 2022-08-29 PROCEDURE — 36415 COLL VENOUS BLD VENIPUNCTURE: CPT

## 2022-08-29 PROCEDURE — 99203 OFFICE O/P NEW LOW 30 MIN: CPT | Performed by: UROLOGY

## 2022-08-29 PROCEDURE — 84443 ASSAY THYROID STIM HORMONE: CPT

## 2022-08-29 RX ORDER — THYROID 60 MG/1
60 TABLET ORAL DAILY
Qty: 30 TABLET | Refills: 1 | Status: SHIPPED | OUTPATIENT
Start: 2022-08-29 | End: 2022-11-23

## 2022-08-29 RX ORDER — THYROID 120 MG/1
120 TABLET ORAL DAILY
Qty: 30 TABLET | Refills: 1 | Status: SHIPPED | OUTPATIENT
Start: 2022-08-29 | End: 2022-11-23

## 2022-08-29 NOTE — PROGRESS NOTES
S: Patient is a 57-year-old male who was requested to be seen by Soni Francis for a consultation with regard to patient's Pinky disease and history of testosterone replacement therapy.  Patient complains of penile curvature that started earlier this year.  It curves upwards.  He has no problem with erections or penetration however.  He denies any pain or trauma to it.  In addition to this he has history of testosterone replacement therapy about a year ago.  It was for low testosterone.  It did not help him however with his sex drive after several months.  Current Outpatient Medications   Medication Sig Dispense Refill     aspirin (ASA) 81 MG chewable tablet Take 81 mg by mouth daily       IBUPROFEN PO        pentoxifylline ER (TRENTAL) 400 MG CR tablet Take 1 tablet (400 mg) by mouth 3 times daily (with meals) 90 tablet 0     thyroid (ARMOUR) 120 MG tablet Take 1 tablet (120 mg) by mouth daily 90 tablet 0     thyroid (ARMOUR) 30 MG tablet Take 1 tablet (30 mg) by mouth daily 90 tablet 0     Thyroid 130 MG TABS Take 130 mg by mouth daily (Patient not taking: Reported on 8/29/2022) 30 tablet 0     No Known Allergies  Past Medical History:   Diagnosis Date     Low testosterone in male      Other specified hypothyroidism      Peyronie's syndrome      Past Surgical History:   Procedure Laterality Date     HERNIA REPAIR Bilateral 1968      Family History   Problem Relation Age of Onset     Diabetes Type 2  Mother      Parkinsonism Father      Coronary Artery Disease No family hx of      Social History     Socioeconomic History     Marital status: Single     Spouse name: None     Number of children: None     Years of education: None     Highest education level: None   Occupational History     Occupation: [employer]   Tobacco Use     Smoking status: Former Smoker     Packs/day: 1.00     Years: 10.00     Pack years: 10.00     Types: Cigarettes     Smokeless tobacco: Never Used   Vaping Use     Vaping Use: Never used    Substance and Sexual Activity     Alcohol use: Yes     Comment: 2 drinks/week     Drug use: Never     Sexual activity: Not Currently     Partners: Female       REVIEW OF SYSTEMS  =================  C: NEGATIVE for fever, chills, change in weight  I: NEGATIVE for worrisome rashes, moles or lesions  E/M: NEGATIVE for ear, mouth and throat problems  R: NEGATIVE for significant cough or SHORTNESS OF BREATH  CV:  NEGATIVE for chest pain, palpitations or peripheral edema  GI: NEGATIVE for nausea, abdominal pain, heartburn, or change in bowel habits  NEURO: NEGATIVE numbness/weakness  : see HPI  PSYCH: NEGATIVE depression/anxiety  LYmph: no new enlarged lymph nodes  Ortho: no new trauma/movements      Physical Exam:  /80   Pulse 65   Resp 16   SpO2 96%    Patient is pleasant, in no acute distress, good general condition.  Heart:  negative, PMI normal  Lung: no evidence of respiratory distress    Abdomen: Soft, nondistended, non tender. No masses. No rebound or guarding.   Exam: Penis with penile plague typical Peyronie's disease.  Testes no masses.  No scrotal skin lesion.  Skin: Warm and dry.  No redness.  Neuro: grossly normal  Musculaskeletal: moving all extremities  Psych normal mood and affect  Musculoskeletal  moving all extremities  Hematologic/Lymphatic/Immunologic: normal ant/post cervical, axillary, supraclavicular and inguinal nodes    Assessment/Plan:     #1 Pinky disease: Treatment options discussed.  We discussed observation, medical management, Xiaflex and lastly surgery.  Pros and cons discussed.  Since patient has no difficulty with penetration he was reassured.  Follow-up with me as needed.    #2 history of testosterone replacement therapy.  Pros and cons of testosterone replacement therapy discussed.  Follow-up as needed.

## 2022-10-22 ENCOUNTER — HEALTH MAINTENANCE LETTER (OUTPATIENT)
Age: 57
End: 2022-10-22

## 2022-11-22 ENCOUNTER — LAB (OUTPATIENT)
Dept: LAB | Facility: CLINIC | Age: 57
End: 2022-11-22
Payer: COMMERCIAL

## 2022-11-22 DIAGNOSIS — E03.9 HYPOTHYROIDISM, UNSPECIFIED TYPE: ICD-10-CM

## 2022-11-22 LAB — TSH SERPL DL<=0.005 MIU/L-ACNC: 1.6 MU/L (ref 0.4–4)

## 2022-11-22 PROCEDURE — 84443 ASSAY THYROID STIM HORMONE: CPT

## 2022-11-22 PROCEDURE — 36415 COLL VENOUS BLD VENIPUNCTURE: CPT

## 2022-11-23 DIAGNOSIS — E03.9 HYPOTHYROIDISM, UNSPECIFIED TYPE: ICD-10-CM

## 2022-11-23 RX ORDER — THYROID 120 MG/1
120 TABLET ORAL DAILY
Qty: 90 TABLET | Refills: 1 | Status: SHIPPED | OUTPATIENT
Start: 2022-11-23 | End: 2022-11-29

## 2022-11-23 RX ORDER — THYROID 60 MG/1
60 TABLET ORAL DAILY
Qty: 90 TABLET | Refills: 1 | Status: SHIPPED | OUTPATIENT
Start: 2022-11-23 | End: 2022-11-29

## 2022-11-29 ENCOUNTER — TELEPHONE (OUTPATIENT)
Dept: FAMILY MEDICINE | Facility: CLINIC | Age: 57
End: 2022-11-29

## 2022-11-29 DIAGNOSIS — E03.9 HYPOTHYROIDISM, UNSPECIFIED TYPE: ICD-10-CM

## 2022-11-29 RX ORDER — THYROID 120 MG/1
120 TABLET ORAL DAILY
Qty: 90 TABLET | Refills: 0 | Status: SHIPPED | OUTPATIENT
Start: 2022-11-29 | End: 2023-01-16

## 2022-11-29 RX ORDER — THYROID 60 MG/1
60 TABLET ORAL DAILY
Qty: 90 TABLET | Refills: 0 | Status: SHIPPED | OUTPATIENT
Start: 2022-11-29 | End: 2023-01-16

## 2022-11-29 RX ORDER — THYROID, PORCINE 120 MG/1
TABLET ORAL
Qty: 30 TABLET | Refills: 0 | OUTPATIENT
Start: 2022-11-29

## 2022-11-29 NOTE — TELEPHONE ENCOUNTER
"See other refill request, I spoke to patient, he requests I send 90 days of both the 120 and 60 mg thyroid med to local [facility].       He will get the next fill at [place] pharmacy, they will ask for refill if it cancels when re-sent.    Somewhat confusing issue, he says his wife may have requested the 30 day Rx at the [facility] pharmacy due to supply issues but he is not sure.    I \"refused\" the Rx to the [facility] pharmacy.    Etelvina Mcclain RN  [facility]      "

## 2022-11-29 NOTE — TELEPHONE ENCOUNTER
I see the 120 and 60 mg throid/armour tabs were sent to [facility] in [place], [place] on 11/23/22.    I called patient, he says he is delayed getting back to [place] due to the weather so wants 90 day Rx sent to local [facility] and will plan to fill the next 90 days at [place].    Re-sent 90 days to [facility] as selected.    Etelvina Mcclain RN  [facility]

## 2022-12-15 ENCOUNTER — VIRTUAL VISIT (OUTPATIENT)
Dept: FAMILY MEDICINE | Facility: CLINIC | Age: 57
End: 2022-12-15
Payer: COMMERCIAL

## 2022-12-15 DIAGNOSIS — R06.02 SOB (SHORTNESS OF BREATH): Primary | ICD-10-CM

## 2022-12-15 DIAGNOSIS — M25.511 CHRONIC RIGHT SHOULDER PAIN: ICD-10-CM

## 2022-12-15 DIAGNOSIS — G89.29 CHRONIC RIGHT SHOULDER PAIN: ICD-10-CM

## 2022-12-15 PROCEDURE — 99213 OFFICE O/P EST LOW 20 MIN: CPT | Mod: 95 | Performed by: NURSE PRACTITIONER

## 2022-12-15 RX ORDER — FLUTICASONE PROPIONATE AND SALMETEROL 100; 50 UG/1; UG/1
1 POWDER RESPIRATORY (INHALATION) 2 TIMES DAILY PRN
Qty: 60 EACH | Refills: 1 | Status: SHIPPED | OUTPATIENT
Start: 2022-12-15 | End: 2023-01-16

## 2022-12-15 ASSESSMENT — ENCOUNTER SYMPTOMS
ARTHRALGIAS: 1
SHORTNESS OF BREATH: 1

## 2022-12-15 NOTE — PROGRESS NOTES
Rony is a 57 year old who is being evaluated via a billable video visit.      How would you like to obtain your AVS? MyChart  If the video visit is dropped, the invitation should be resent by: Text to cell phone: [phone]  Will anyone else be joining your video visit? No      Assessment & Plan     SOB (shortness of breath)  We will plan to obtain PFTs-probable exposure to asbestos in the workplace.  Obtain chest x-ray.  Advair inhaler given to use as needed.  - General PFT Lab (Please always keep checked); Future  - XR Chest 2 Views; Future  - fluticasone-salmeterol (ADVAIR) 100-50 MCG/ACT inhaler; Inhale 1 puff into the lungs 2 times daily as needed (shortness of breath)    Chronic right shoulder pain  Patient concerned with CHF due to having pain in right shoulder and upper back.  We will plan to obtain chest x-ray.  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  Full plan will depend on outcome.      Review of the result(s) of each unique test - Labs, imaging  Ordering of each unique test  Prescription drug management  24 minutes spent on the date of the encounter doing chart review, history and exam, documentation and further activities per the note       No follow-ups on file.    MARTINA Diane [facility] JESSICA Fry   Rony is a 57 year old, presenting for the following health issues:  Musculoskeletal Problem      HPI     Concern - Right Shoulder Pain and SOB  Onset: 2-3 months  Description: Patient arrived with concerns of Right Shoulder pain and requesting inhaler refill;  Pt reports he for the last 2-3 months he has been dealing with right shoulder pains, he takes 3 ibuprofen in the morning and at night or else his right bicep will begin to feel weak. Unsure of any cause or injury but pt is an active person, works on houses for a living. Pt is concerned because his sister has congestive heart failure and she told him her only  symptom was shoulder pain. Requesting inhaler because 3 years ago pt had episode where he woke up and was unable to breath, this came out of nowhere and is unsure if at the time he had covid but ever since has struggled with SOB especially worsens with activity. Last used inhaler 5 months ago.       Video visit completed due to COVID-19 outbreak.    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  Had sudden onset of shortness of breath approximately 3 years ago.  Was evaluated in [place] at that time.  Had unknown illness.  Had chest x-ray, nebulizer, 2 rounds of prednisone, prescription for albuterol and Advair.  Since that time feels like gets more short of breath than prior.  Uncertain if had COVID.  Would use Advair inhaler as needed, 1-2 times per month.  Has noticed now since has not used it for about the last 5 months that is more short of breath than used to be.  No chest pain, palpitations, dizziness or lightheaded.  Thinks has had exposure to asbestos in the past.    Having right shoulder pain, shoulder blade pain near spine will feel some numbness at times. If lays on shoulder it will hurt. Works 12-14 hours-[employer]ing and edson. Pain into bicep. Bicep feels weak. If takes 3 ibuprofen in AM and PM pain will be less.  Mother and sister with CHF.  Was discussing pain with sister.  She stated that her initial CHF exacerbation started with pain in right shoulder.  Is concerned that could have heart failure.  No chest pain, palpitations.      Review of Systems   Respiratory: Positive for shortness of breath.    Musculoskeletal: Positive for arthralgias (Right shoulder).          Objective           Vitals:  No vitals were obtained today due to virtual visit.    Physical Exam  Constitutional:       General: He is not in acute distress.     Appearance: Normal appearance. He is not toxic-appearing.   HENT:      Nose: No congestion.   Eyes:      General: Lids are normal.   Pulmonary:      Effort: Pulmonary effort is  normal. No tachypnea, bradypnea or respiratory distress.   Skin:     Coloration: Skin is not ashen, cyanotic, jaundiced or pale.   Neurological:      Mental Status: He is alert.   Psychiatric:         Mood and Affect: Mood normal.         Speech: Speech normal.         Behavior: Behavior normal. Behavior is cooperative.             Video-Visit Details    Video Start Time: 141    Type of service:  Video Visit    Video End Time:1:56 PM    Originating Location (pt. Location): Home  Distant Location (provider location):  On-site    Platform used for Video Visit: iWatt

## 2022-12-16 ENCOUNTER — ANCILLARY PROCEDURE (OUTPATIENT)
Dept: GENERAL RADIOLOGY | Facility: CLINIC | Age: 57
End: 2022-12-16
Attending: NURSE PRACTITIONER
Payer: COMMERCIAL

## 2022-12-16 DIAGNOSIS — R06.02 SOB (SHORTNESS OF BREATH): ICD-10-CM

## 2022-12-16 PROCEDURE — 71046 X-RAY EXAM CHEST 2 VIEWS: CPT | Mod: TC | Performed by: RADIOLOGY

## 2023-01-12 NOTE — PROGRESS NOTES
SUBJECTIVE:   CC: Rony is an 57 year old who presents for preventative health visit.     Patient has been advised of split billing requirements and indicates understanding: Yes     HPI    *TSH high? Bilateral tinnitus     *Discuss R shoulder pain. Left hand dominant. Was previously using three 200mg IBU in AM and PM but stopped using and tried using hemp oil which has seemed to help    Today's PHQ-2 Score:   PHQ-2 ( 1999 Pfizer) 1/16/2023   Q1: Little interest or pleasure in doing things 0   Q2: Feeling down, depressed or hopeless 1   PHQ-2 Score 1   Q1: Little interest or pleasure in doing things Not at all   Q2: Feeling down, depressed or hopeless Several days   PHQ-2 Score 1     Social History     Tobacco Use     Smoking status: Former     Packs/day: 1.00     Years: 10.00     Pack years: 10.00     Types: Cigarettes     Smokeless tobacco: Never   Substance Use Topics     Alcohol use: Yes     Comment: 2 drinks/week       Alcohol Use 1/16/2023   Prescreen: >3 drinks/day or >7 drinks/week? No       Last PSA: No results found for: PSA    Reviewed orders with patient. Reviewed health maintenance and updated orders accordingly - Yes  BP Readings from Last 3 Encounters:   01/16/23 138/72   08/29/22 122/80   07/06/22 128/81    Wt Readings from Last 3 Encounters:   01/16/23 93.9 kg (207 lb)   07/06/22 92.3 kg (203 lb 6.4 oz)   04/13/17 96.2 kg (212 lb)         Reviewed and updated as needed this visit by clinical staff   Tobacco  Allergies  Meds           Margaret Ascencio [facility]    Reviewed and updated as needed this visit by Provider                      Here today for medication recheck. Needs to have refills.     Ears are ringing. Left one is worse. 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 Seems louder at night time. No dizziness or light head. Has not really noticed a change in hearing.     Chronic right shoulder pain. Was taking ibuprofen at least daily to help control the pain. Friend recommend  Using hemp oil. Started using it a couple of weeks ago-pain is less and has not needed any ibuprofen. Does feel some weakness in bicep.     If does not pentoxifylline pills 2/3 of penis will hurt. Pain with urination after intercouse.  If takes the pills does not have pain. Plans to continue to take them. Did see urology last year.     Family history of CHF. Will occasionally get pain to left chest. Will just feel like someone is pushing on chest. Has occurred 8-10 times in the last 6 months. Mom CHF and sister with CHF. No hearturn or refulx. No palpitations or fluttering, no dizziness or light head, no nausea, no sweating, no radiating pain. Works construction, edson, and siding. Doesn not every have the pain when is working or lifting.     Last PSA: No family history   Last Colonoscopy: Never, father with colostomy  Last eye exam: Just had eye exam  Last dental exam: Went around [place]  Last tetanus vaccine: 2017  Last influenza vaccine: Declines   Last shingles vaccine: Never  Last pneumonia vaccine: Not applicable  Last COVID vaccine: Declines  Last COVID booster: Not applicable  Hep C screen: 7/22  HIV screen: 7/22  AAA screen (age 65-75 with smoking hx): Not applicable  IVD (HTN, Hyperlipid, Smoking): Not applicable  Lung CA screening (50-80, 20 pk smoking hx) Quit within 15 years: smoked for 6-8 years. Quit at age 33    Review of Systems   Constitutional: Negative for chills, diaphoresis, fatigue and fever.   HENT: Negative for congestion, ear pain, hearing loss, postnasal drip, rhinorrhea, sinus pressure and sore throat.    Eyes: Negative for pain, discharge and visual disturbance.   Respiratory: Negative for cough, chest tightness, shortness of breath and wheezing.    Cardiovascular: Positive for chest pain. Negative for palpitations and peripheral edema.   Gastrointestinal: Positive for nausea. Negative for abdominal pain, constipation, diarrhea, heartburn and vomiting.   Genitourinary: Positive for  "impotence (curved erection). Negative for dysuria, frequency, genital sores, hematuria, penile discharge and urgency.   Musculoskeletal: Positive for arthralgias (right shoulder pain) and myalgias. Negative for joint swelling.   Skin: Negative for rash.   Neurological: Negative for dizziness, weakness, light-headedness, numbness, headaches and paresthesias.   Hematological: Does not bruise/bleed easily.   Psychiatric/Behavioral: Negative for confusion. The patient is not nervous/anxious.        OBJECTIVE:   /72   Pulse 70   Temp 97.9  F (36.6  C) (Tympanic)   Resp 14   Ht 1.683 m (5' 6.25\")   Wt 93.9 kg (207 lb)   SpO2 96%   BMI 33.16 kg/m      Physical Exam  Constitutional:       Appearance: He is well-developed.   HENT:      Right Ear: Tympanic membrane and external ear normal.      Left Ear: Tympanic membrane and external ear normal.      Mouth/Throat:      Pharynx: Uvula midline.   Neck:      Thyroid: No thyromegaly.      Vascular: No carotid bruit.   Cardiovascular:      Rate and Rhythm: Normal rate and regular rhythm.      Heart sounds: Normal heart sounds.   Pulmonary:      Effort: Pulmonary effort is normal.      Breath sounds: Normal breath sounds.   Abdominal:      General: Bowel sounds are normal.      Palpations: Abdomen is soft.   Skin:     General: Skin is warm and dry.   Neurological:      Mental Status: He is alert.         ASSESSMENT/PLAN:   Routine general medical examination at a health care facility  Screening guidelines reviewed.     Screen for colon cancer  - Colonoscopy Screening  Referral; Future    Hypothyroidism, unspecified type  Will recheck lab here today to try and get labs, refills and OV all due at the same time  - thyroid (ARMOUR) 120 MG tablet; Take 1 tablet (120 mg) by mouth daily  - thyroid (ARMOUR) 60 MG tablet; Take 1 tablet (60 mg) by mouth daily  - TSH with free T4 reflex; Future    SOB (shortness of breath)  Taking and working well to control symptoms. " "  - fluticasone-salmeterol (ADVAIR) 100-50 MCG/ACT inhaler; Inhale 1 puff into the lungs 2 times daily as needed (shortness of breath)    Peyronie's syndrome  Medication helping to reduce and control pain.   Refill given  Plan to follow up in 1 year  - pentoxifylline ER (TRENTAL) 400 MG CR tablet; Take 1 tablet (400 mg) by mouth 3 times daily (with meals)    Chest pain, unspecified type  Will check EKG here today-unremarkable  Last lipids WNL  Education given regarding warning signs to watch for and when would need to seek immediate medical attention.   - EKG 12-lead complete w/read - Clinics    Tinnitus, bilateral  - Adult Audiology  Referral; Future    Screening for diabetes mellitus  - Basic metabolic panel; Future  - Hemoglobin A1c; Future    Screening for lipoid disorders  - Lipid panel reflex to direct LDL Fasting; Future    Patient has been advised of split billing requirements and indicates understanding: Yes      COUNSELING:   Reviewed preventive health counseling, as reflected in patient instructions       Regular exercise       Healthy diet/nutrition       Vision screening       Immunizations    Declined: Covid-19 and Influenza due to Conscientious objector               Consider Hep C screening for all patients one time for ages 18-79 years       HIV screeninx in teen years, 1x in adult years, and at intervals if high risk       Colorectal cancer screening       Prostate cancer screening      BMI:   Estimated body mass index is 33.16 kg/m  as calculated from the following:    Height as of this encounter: 1.683 m (5' 6.25\").    Weight as of this encounter: 93.9 kg (207 lb).         He reports that he has quit smoking. His smoking use included cigarettes. He has a 10.00 pack-year smoking history. He has never used smokeless tobacco.        MARTINA Diane CNP  [facility] JESSICA  "

## 2023-01-12 NOTE — PATIENT INSTRUCTIONS
Please go to the pharmacy to receive your shingles vaccine, Shingrix.  It is a series of 2.  You should receive the first vaccine and then get the second vaccine in at least 2 months.  If more time lapses that is okay.  Do see a reaction similar to tetanus where your arm gets red, stiff sometimes warm to touch.  After the second dose it is very common to feel tired and rundown for 24 hours or so.      Preventive Health Recommendations  Male Ages 50 - 64    Yearly exam:             See your health care provider every year in order to  o   Review health changes.   o   Discuss preventive care.    o   Review your medicines if your doctor has prescribed any.   Have a cholesterol test every 5 years, or more frequently if you are at risk for high cholesterol/heart disease.   Have a diabetes test (fasting glucose) every three years. If you are at risk for diabetes, you should have this test more often.   Have a colonoscopy at age 50, or have a yearly FIT test (stool test). These exams will check for colon cancer.    Talk with your health care provider about whether or not a prostate cancer screening test (PSA) is right for you.  You should be tested each year for STDs (sexually transmitted diseases), if you re at risk.     Shots: Get a flu shot each year. Get a tetanus shot every 10 years.     Nutrition:  Eat at least 5 servings of fruits and vegetables daily.   Eat whole-grain bread, whole-wheat pasta and brown rice instead of white grains and rice.   Get adequate Calcium and Vitamin D.     Lifestyle  Exercise for at least 150 minutes a week (30 minutes a day, 5 days a week). This will help you control your weight and prevent disease.   Limit alcohol to one drink per day.   No smoking.   Wear sunscreen to prevent skin cancer.   See your dentist every six months for an exam and cleaning.   See your eye doctor every 1 to 2 years.

## 2023-01-16 ENCOUNTER — OFFICE VISIT (OUTPATIENT)
Dept: FAMILY MEDICINE | Facility: CLINIC | Age: 58
End: 2023-01-16
Payer: COMMERCIAL

## 2023-01-16 VITALS
DIASTOLIC BLOOD PRESSURE: 72 MMHG | OXYGEN SATURATION: 96 % | RESPIRATION RATE: 14 BRPM | TEMPERATURE: 97.9 F | BODY MASS INDEX: 33.27 KG/M2 | HEIGHT: 66 IN | SYSTOLIC BLOOD PRESSURE: 138 MMHG | HEART RATE: 70 BPM | WEIGHT: 207 LBS

## 2023-01-16 DIAGNOSIS — H93.13 TINNITUS, BILATERAL: ICD-10-CM

## 2023-01-16 DIAGNOSIS — Z00.00 ROUTINE GENERAL MEDICAL EXAMINATION AT A HEALTH CARE FACILITY: ICD-10-CM

## 2023-01-16 DIAGNOSIS — E03.9 HYPOTHYROIDISM, UNSPECIFIED TYPE: ICD-10-CM

## 2023-01-16 DIAGNOSIS — Z12.11 SCREEN FOR COLON CANCER: ICD-10-CM

## 2023-01-16 DIAGNOSIS — R06.02 SOB (SHORTNESS OF BREATH): ICD-10-CM

## 2023-01-16 DIAGNOSIS — N48.6 PEYRONIE'S SYNDROME: ICD-10-CM

## 2023-01-16 DIAGNOSIS — Z13.220 SCREENING FOR LIPOID DISORDERS: ICD-10-CM

## 2023-01-16 DIAGNOSIS — Z13.1 SCREENING FOR DIABETES MELLITUS: ICD-10-CM

## 2023-01-16 DIAGNOSIS — R07.9 CHEST PAIN, UNSPECIFIED TYPE: Primary | ICD-10-CM

## 2023-01-16 PROCEDURE — 99396 PREV VISIT EST AGE 40-64: CPT | Performed by: NURSE PRACTITIONER

## 2023-01-16 PROCEDURE — 99214 OFFICE O/P EST MOD 30 MIN: CPT | Mod: 25 | Performed by: NURSE PRACTITIONER

## 2023-01-16 PROCEDURE — 93000 ELECTROCARDIOGRAM COMPLETE: CPT | Performed by: NURSE PRACTITIONER

## 2023-01-16 RX ORDER — FLUTICASONE PROPIONATE AND SALMETEROL 100; 50 UG/1; UG/1
1 POWDER RESPIRATORY (INHALATION) 2 TIMES DAILY PRN
Qty: 60 EACH | Refills: 1 | Status: SHIPPED | OUTPATIENT
Start: 2023-01-16 | End: 2024-01-22

## 2023-01-16 RX ORDER — PENTOXIFYLLINE 400 MG/1
400 TABLET, EXTENDED RELEASE ORAL
Qty: 270 TABLET | Refills: 3 | Status: SHIPPED | OUTPATIENT
Start: 2023-01-16

## 2023-01-16 RX ORDER — THYROID 120 MG/1
120 TABLET ORAL DAILY
Qty: 90 TABLET | Refills: 3 | Status: SHIPPED | OUTPATIENT
Start: 2023-01-16 | End: 2024-01-25

## 2023-01-16 RX ORDER — THYROID 60 MG/1
60 TABLET ORAL DAILY
Qty: 90 TABLET | Refills: 3 | Status: SHIPPED | OUTPATIENT
Start: 2023-01-16 | End: 2024-01-25

## 2023-01-16 ASSESSMENT — ENCOUNTER SYMPTOMS
SHORTNESS OF BREATH: 0
EYE PAIN: 0
FEVER: 0
CHILLS: 0
WHEEZING: 0
HEARTBURN: 0
PALPITATIONS: 0
COUGH: 0
DIAPHORESIS: 0
NUMBNESS: 0
EYE DISCHARGE: 0
DIARRHEA: 0
ARTHRALGIAS: 1
JOINT SWELLING: 0
HEADACHES: 1
HEMATOCHEZIA: 1
CONSTIPATION: 0
SINUS PRESSURE: 0
NERVOUS/ANXIOUS: 0
DIZZINESS: 0
WEAKNESS: 0
HEMATURIA: 0
FATIGUE: 0
DIZZINESS: 1
ABDOMINAL PAIN: 0
CONFUSION: 0
BRUISES/BLEEDS EASILY: 0
LIGHT-HEADEDNESS: 0
HEADACHES: 0
PARESTHESIAS: 0
CHEST TIGHTNESS: 0
RHINORRHEA: 0
NAUSEA: 1
FREQUENCY: 0
VOMITING: 0
SORE THROAT: 0
MYALGIAS: 1
DYSURIA: 0

## 2023-01-16 ASSESSMENT — PAIN SCALES - GENERAL: PAINLEVEL: NO PAIN (0)

## 2023-01-18 ENCOUNTER — TELEPHONE (OUTPATIENT)
Dept: FAMILY MEDICINE | Facility: CLINIC | Age: 58
End: 2023-01-18
Payer: COMMERCIAL

## 2023-01-18 DIAGNOSIS — E03.9 HYPOTHYROIDISM, UNSPECIFIED TYPE: Primary | ICD-10-CM

## 2023-01-18 NOTE — TELEPHONE ENCOUNTER
Prior Authorization Retail Medication Request    Medication/Dose: [place] Thyroid 60mg  ICD code (if different than what is on RX):    Previously Tried and Failed:    Rationale:  EXCLUDED DRUG, EXCEPTION PROCESS MAY BE   AVAILABLE    [facility]  [facility]   Insurance ID:  [id number]      Pharmacy Information (if different than what is on RX)  Name:    Phone:            Thank you,  Candis Palacios [facility] [place]  [phone]

## 2023-01-21 NOTE — TELEPHONE ENCOUNTER
Central Prior Authorization Team   Phone: [phone]    PA Initiation    Medication: [place] Thyroid 60mg  Insurance Company: [employer] - Phone [phone] Fax [phone]  Pharmacy Filling the Rx: [facility] - [id number] [facility]  Filling Pharmacy Phone: [phone]  Filling Pharmacy Fax:    Start Date: 1/21/2023

## 2023-01-23 NOTE — TELEPHONE ENCOUNTER
PRIOR AUTHORIZATION DENIED    Medication: [place] Thyroid 60mg    Denial Date: 1/23/2023    Denial Rational:              Appeal Information:    If you would like to appeal, please supply P/A team with a letter of medical necessity with clinical reason.

## 2023-01-25 ENCOUNTER — TELEPHONE (OUTPATIENT)
Dept: FAMILY MEDICINE | Facility: CLINIC | Age: 58
End: 2023-01-25
Payer: COMMERCIAL

## 2023-01-25 NOTE — TELEPHONE ENCOUNTER
Medication Appeal Initiation    We have initiated an appeal for the requested medication:  Medication: [place] Thyroid 60mg  Appeal Start Date:  1/25/2023  Insurance Company: [employer] - Phone [phone] Fax [phone]  Comments:

## 2023-01-26 ENCOUNTER — LAB (OUTPATIENT)
Dept: LAB | Facility: CLINIC | Age: 58
End: 2023-01-26
Payer: COMMERCIAL

## 2023-01-26 DIAGNOSIS — Z13.220 SCREENING FOR LIPOID DISORDERS: ICD-10-CM

## 2023-01-26 DIAGNOSIS — E03.9 HYPOTHYROIDISM, UNSPECIFIED TYPE: ICD-10-CM

## 2023-01-26 DIAGNOSIS — Z13.1 SCREENING FOR DIABETES MELLITUS: ICD-10-CM

## 2023-01-26 LAB
ANION GAP SERPL CALCULATED.3IONS-SCNC: 8 MMOL/L (ref 3–14)
BUN SERPL-MCNC: 28 MG/DL (ref 7–30)
CALCIUM SERPL-MCNC: 9.2 MG/DL (ref 8.5–10.1)
CHLORIDE BLD-SCNC: 107 MMOL/L (ref 94–109)
CHOLEST SERPL-MCNC: 155 MG/DL
CO2 SERPL-SCNC: 26 MMOL/L (ref 20–32)
CREAT SERPL-MCNC: 1.12 MG/DL (ref 0.66–1.25)
FASTING STATUS PATIENT QL REPORTED: YES
GFR SERPL CREATININE-BSD FRML MDRD: 77 ML/MIN/1.73M2
GLUCOSE BLD-MCNC: 117 MG/DL (ref 70–99)
HBA1C MFR BLD: 5.3 % (ref 0–5.6)
HDLC SERPL-MCNC: 47 MG/DL
LDLC SERPL CALC-MCNC: 88 MG/DL
NONHDLC SERPL-MCNC: 108 MG/DL
POTASSIUM BLD-SCNC: 4.1 MMOL/L (ref 3.4–5.3)
SODIUM SERPL-SCNC: 141 MMOL/L (ref 133–144)
TRIGL SERPL-MCNC: 100 MG/DL
TSH SERPL DL<=0.005 MIU/L-ACNC: 0.64 MU/L (ref 0.4–4)

## 2023-01-26 PROCEDURE — 36415 COLL VENOUS BLD VENIPUNCTURE: CPT

## 2023-01-26 PROCEDURE — 80061 LIPID PANEL: CPT

## 2023-01-26 PROCEDURE — 83036 HEMOGLOBIN GLYCOSYLATED A1C: CPT

## 2023-01-26 PROCEDURE — 80048 BASIC METABOLIC PNL TOTAL CA: CPT

## 2023-01-26 PROCEDURE — 84443 ASSAY THYROID STIM HORMONE: CPT

## 2023-02-06 NOTE — TELEPHONE ENCOUNTER
Is there any follow up on this appeal for the [place] Thyroid?          Thank you,  Candis Palacios [facility]  [phone]

## 2023-02-06 NOTE — TELEPHONE ENCOUNTER
MEDICATION APPEAL DENIED    Medication: [place] Thyroid 60mg    Denial Date: 2/6/2023    Denial Rational:  I called the insurance and they stated that the denial has been upheld.                       Second Level Appeal Information:      Second level appeals will be managed by the clinic staff and provider. Please contact the [employer] Prior Authorization Team if additional information about the denial is needed.

## 2023-02-07 NOTE — TELEPHONE ENCOUNTER
Brayden Shafer-  So I was just playing with this prescription for Alvarez, after seeing the appeal was denied.     NP Thyroid does go through his insurance, is that something you would be willing to switch it to for the patient?    Let us know.        Thank you,  Candis Palacios [facility] [place]  [phone]

## 2023-02-08 RX ORDER — THYROID 60 MG/1
60 TABLET ORAL DAILY
Qty: 90 TABLET | Refills: 3 | Status: SHIPPED | OUTPATIENT
Start: 2023-02-08 | End: 2024-01-22

## 2023-02-08 RX ORDER — THYROID 120 MG/1
120 TABLET ORAL DAILY
Qty: 90 TABLET | Refills: 3 | Status: SHIPPED | OUTPATIENT
Start: 2023-02-08 | End: 2024-01-22

## 2023-04-01 ENCOUNTER — ANCILLARY PROCEDURE (OUTPATIENT)
Dept: GENERAL RADIOLOGY | Facility: CLINIC | Age: 58
End: 2023-04-01
Attending: PEDIATRICS
Payer: COMMERCIAL

## 2023-04-01 ENCOUNTER — OFFICE VISIT (OUTPATIENT)
Dept: ORTHOPEDICS | Facility: CLINIC | Age: 58
End: 2023-04-01
Payer: COMMERCIAL

## 2023-04-01 DIAGNOSIS — M54.6 CHRONIC RIGHT-SIDED THORACIC BACK PAIN: ICD-10-CM

## 2023-04-01 DIAGNOSIS — M25.511 PAIN IN JOINT OF RIGHT SHOULDER: ICD-10-CM

## 2023-04-01 DIAGNOSIS — G89.29 CHRONIC RIGHT-SIDED THORACIC BACK PAIN: ICD-10-CM

## 2023-04-01 DIAGNOSIS — G89.29 CHRONIC RIGHT SHOULDER PAIN: Primary | ICD-10-CM

## 2023-04-01 DIAGNOSIS — M19.011 ARTHRITIS OF RIGHT ACROMIOCLAVICULAR JOINT: ICD-10-CM

## 2023-04-01 DIAGNOSIS — M25.511 CHRONIC RIGHT SHOULDER PAIN: Primary | ICD-10-CM

## 2023-04-01 PROCEDURE — 73030 X-RAY EXAM OF SHOULDER: CPT | Mod: TC | Performed by: RADIOLOGY

## 2023-04-01 PROCEDURE — 99203 OFFICE O/P NEW LOW 30 MIN: CPT | Performed by: PEDIATRICS

## 2023-04-01 NOTE — LETTER
4/1/2023         RE: Alvarez Subramanian  [address]  [place] [facility] [place] [address]        Dear Colleague,    Thank you for referring your patient, Alvarez Subramanian, to the [facility]. Please see a copy of my visit note below.    ASSESSMENT & PLAN    Alvarez was seen today for pain.    Diagnoses and all orders for this visit:    Chronic right shoulder pain  -     XR Shoulder Right G/E 3 Views; Future  -     Physical Therapy Referral; Future    Chronic right-sided thoracic back pain  -     Physical Therapy Referral; Future    Arthritis of right acromioclavicular joint      This issue is chronic and Unchanged.      Low suspicion for rotator cuff tear given current history and exam.  Recommended rest from irritating activities coupled with physical therapy.  Would consider further imaging or treatment pending clinical course.    Some concern for possible cervical nerve pain given pain into scapula, however, cervical exam is reassuring.    Plan:  - Today's Plan of Care:  Rehab: Physical Therapy: [facility] - [phone]    Discussed activity considerations and other supportive care including Ice/Heat, OTC and other topical medications as needed.    -We also discussed other future treatment options:  MRI (right shoulder v. Cervical)    Follow Up: 6 - 8 weeks    Concerning signs and symptoms were reviewed.  The patient expressed understanding of this management plan and all questions were answered at this time.    Judith Palencia MD [facility]  Sports Medicine Physician  [facility]      -----  Chief Complaint   Patient presents with     Right Shoulder - Pain       SUBJECTIVE  Alvarez Subramanian is a/an 58 year old male who is seen as a self referral for evaluation of chronic right shoulder pain, scapular pain    The patient is seen by themselves.  The patient is Left handed    Onset:Gradual onset of pain and weakness over the past 6+ month(s) that initially started  "with lifting and reaching at work.      Location of Pain: right posterior lateral shoulder, periscapular region  - some pain shooting into scapula, arm, otherwise no cervical pain    Worsened by: lifting, reaching out to side/behind back, lying supine or on right shoulder  Better with: changing positions, activity modification, ibuprofen  Treatments tried: rest/activity avoidance, ice, ibuprofen and CBD/Hemp Oil  Associated symptoms: weakness of shoulder/rotator cuff with arm outstretched    Orthopedic/Surgical history: NO  Social History/Occupation: self-employed exterior roof/siding contractor    No family history pertinent to patient's problem today.    REVIEW OF SYSTEMS:  Review of Systems  Skin: no bruising, no swelling  Musculoskeletal: as above  Neurologic: no numbness, paresthesias  Remainder of review of systems is negative including constitutional, CV, pulmonary, GI, except as noted in HPI or medical history.    OBJECTIVE:  Ht (P) 1.683 m (5' 6.25\")   Wt (P) 97.5 kg (215 lb)   BMI (P) 34.44 kg/m     General: healthy, alert and in no distress  HEENT: no scleral icterus or conjunctival erythema  Skin: no suspicious lesions or rash. No jaundice.  CV: distal perfusion intact  Resp: normal respiratory effort without conversational dyspnea   Psych: normal mood and affect  Gait: NORMAL  Neuro: Normal light sensory exam of upper extremity    Cervical Spine Exam    Inspection:       No visible deformity        normal lordotic curvature maintained    Posture:      rounded shoulders and upper back    Tender:      Points to thoracic, periscapular area    Non-Tender:      remainder of cervical spine area    Range of Motion:       Full active and passive ROM forward flexion, extension, lateral rotation, lateral flexion.    Painful Motions:      none    Strength:     C4 (shoulder shrug)  symmetric 5/5       C5 (shoulder abduction) symmetric 5/5       C6 (elbow flexion) symmetric 5/5       C7 (elbow extension) symmetric " 5/5       C8 (finger abduction, thumb flexion) symmetric 5/5    Sensation:     grossly intact througout bilateral upper extremities    Special Tests:      neg (-) Spurling    Skin:     well perfused       capillary refill brisk    Lymphatics:      no edema noted in the upper extremities     Bilateral Shoulder exam    Inspection and Posture:       rounded shoulders and upper back    Skin:        no visible deformities    Tender:        none    Non Tender:       remainder of shoulder bilateral    ROM:        Full active and passive ROM with flexion, extension, abduction, internal and external rotation bilateral       asymmetric scapular motion    Painful motions:       end range flexion and elevation right    Strength:        abduction 5/5 bilateral       flexion 5/5 bilateral       internal rotation 5/5 bilateral       external rotation 5/5 bilateral    Impingement testing:       neg (-) Neer right       positive (+) Abraham right    Sensation:        normal sensation over shoulder and upper extremity     RADIOLOGY:  I independently ordered, visualized and reviewed these images with the patient  4 XR views of right shoulder reviewed: no acute bony abnormality, AC joint degenerative change  - will follow official read      Review of the result(s) of each unique test - XR             Again, thank you for allowing me to participate in the care of your patient.        Sincerely,        Judith Palencia MD

## 2023-04-01 NOTE — PROGRESS NOTES
ASSESSMENT & PLAN    Alvarez was seen today for pain.    Diagnoses and all orders for this visit:    Chronic right shoulder pain  -     XR Shoulder Right G/E 3 Views; Future  -     Physical Therapy Referral; Future    Chronic right-sided thoracic back pain  -     Physical Therapy Referral; Future    Arthritis of right acromioclavicular joint      This issue is chronic and Unchanged.      Low suspicion for rotator cuff tear given current history and exam.  Recommended rest from irritating activities coupled with physical therapy.  Would consider further imaging or treatment pending clinical course.    Some concern for possible cervical nerve pain given pain into scapula, however, cervical exam is reassuring.    Plan:  - Today's Plan of Care:  Rehab: Physical Therapy: [facility] - [phone]    Discussed activity considerations and other supportive care including Ice/Heat, OTC and other topical medications as needed.    -We also discussed other future treatment options:  MRI (right shoulder v. Cervical)    Follow Up: 6 - 8 weeks    Concerning signs and symptoms were reviewed.  The patient expressed understanding of this management plan and all questions were answered at this time.    Judith Palencia MD [facility]  Sports Medicine Physician  [facility]      -----  Chief Complaint   Patient presents with     Right Shoulder - Pain       SUBJECTIVE  Alvarez Subramanian is a/an 58 year old male who is seen as a self referral for evaluation of chronic right shoulder pain, scapular pain    The patient is seen by themselves.  The patient is Left handed    Onset:Gradual onset of pain and weakness over the past 6+ month(s) that initially started with lifting and reaching at work.      Location of Pain: right posterior lateral shoulder, periscapular region  - some pain shooting into scapula, arm, otherwise no cervical pain    Worsened by: lifting, reaching out to side/behind back, lying supine or on right  "shoulder  Better with: changing positions, activity modification, ibuprofen  Treatments tried: rest/activity avoidance, ice, ibuprofen and CBD/Hemp Oil  Associated symptoms: weakness of shoulder/rotator cuff with arm outstretched    Orthopedic/Surgical history: NO  Social History/Occupation: self-employed exterior roof/siding contractor    No family history pertinent to patient's problem today.    REVIEW OF SYSTEMS:  Review of Systems  Skin: no bruising, no swelling  Musculoskeletal: as above  Neurologic: no numbness, paresthesias  Remainder of review of systems is negative including constitutional, CV, pulmonary, GI, except as noted in HPI or medical history.    OBJECTIVE:  Ht (P) 1.683 m (5' 6.25\")   Wt (P) 97.5 kg (215 lb)   BMI (P) 34.44 kg/m     General: healthy, alert and in no distress  HEENT: no scleral icterus or conjunctival erythema  Skin: no suspicious lesions or rash. No jaundice.  CV: distal perfusion intact  Resp: normal respiratory effort without conversational dyspnea   Psych: normal mood and affect  Gait: NORMAL  Neuro: Normal light sensory exam of upper extremity    Cervical Spine Exam    Inspection:       No visible deformity        normal lordotic curvature maintained    Posture:      rounded shoulders and upper back    Tender:      Points to thoracic, periscapular area    Non-Tender:      remainder of cervical spine area    Range of Motion:       Full active and passive ROM forward flexion, extension, lateral rotation, lateral flexion.    Painful Motions:      none    Strength:     C4 (shoulder shrug)  symmetric 5/5       C5 (shoulder abduction) symmetric 5/5       C6 (elbow flexion) symmetric 5/5       C7 (elbow extension) symmetric 5/5       C8 (finger abduction, thumb flexion) symmetric 5/5    Sensation:     grossly intact througout bilateral upper extremities    Special Tests:      neg (-) Spurling    Skin:     well perfused       capillary refill brisk    Lymphatics:      no edema noted " in the upper extremities     Bilateral Shoulder exam    Inspection and Posture:       rounded shoulders and upper back    Skin:        no visible deformities    Tender:        none    Non Tender:       remainder of shoulder bilateral    ROM:        Full active and passive ROM with flexion, extension, abduction, internal and external rotation bilateral       asymmetric scapular motion    Painful motions:       end range flexion and elevation right    Strength:        abduction 5/5 bilateral       flexion 5/5 bilateral       internal rotation 5/5 bilateral       external rotation 5/5 bilateral    Impingement testing:       neg (-) Neer right       positive (+) Abraham right    Sensation:        normal sensation over shoulder and upper extremity     RADIOLOGY:  I independently ordered, visualized and reviewed these images with the patient  4 XR views of right shoulder reviewed: no acute bony abnormality, AC joint degenerative change  - will follow official read      Review of the result(s) of each unique test - XR

## 2023-04-01 NOTE — PATIENT INSTRUCTIONS
Low suspicion for rotator cuff tear given current history and exam.  Recommended rest from irritating activities coupled with physical therapy.  Would consider further imaging or treatment pending clinical course.    Some concern for possible cervical nerve pain given pain into scapula, however, cervical exam is reassuring.    Plan:  - Today's Plan of Care:  Rehab: [facility] [phone]    Discussed activity considerations and other supportive care including Ice/Heat, OTC and other topical medications as needed.    -We also discussed other future treatment options:  MRI (right shoulder v. Cervical)    Follow Up: 6 - 8 weeks    If you have any further questions for your physician or physician s care team you can call [phone] and use option 3 to leave a voice message.

## 2023-04-18 ENCOUNTER — THERAPY VISIT (OUTPATIENT)
Dept: PHYSICAL THERAPY | Facility: CLINIC | Age: 58
End: 2023-04-18
Attending: PEDIATRICS
Payer: COMMERCIAL

## 2023-04-18 DIAGNOSIS — G89.29 CHRONIC RIGHT-SIDED THORACIC BACK PAIN: ICD-10-CM

## 2023-04-18 DIAGNOSIS — M75.41 IMPINGEMENT SYNDROME, SHOULDER, RIGHT: ICD-10-CM

## 2023-04-18 DIAGNOSIS — G89.29 CHRONIC RIGHT SHOULDER PAIN: ICD-10-CM

## 2023-04-18 DIAGNOSIS — M54.6 CHRONIC RIGHT-SIDED THORACIC BACK PAIN: ICD-10-CM

## 2023-04-18 DIAGNOSIS — M25.511 CHRONIC RIGHT SHOULDER PAIN: ICD-10-CM

## 2023-04-18 PROCEDURE — 97161 PT EVAL LOW COMPLEX 20 MIN: CPT | Mod: GP | Performed by: PHYSICAL THERAPIST

## 2023-04-18 PROCEDURE — 97110 THERAPEUTIC EXERCISES: CPT | Mod: GP | Performed by: PHYSICAL THERAPIST

## 2023-04-18 NOTE — PROGRESS NOTES
Physical Therapy Initial Evaluation  Subjective:  The history is provided by the patient.   Patient Health History  Alvarez Subramanian being seen for To pin point where the shoulder problem occurs and a possible remedy. Xrays only revealed alittle arthritis.     Problem began: 10/13/2022.   Problem occurred: Over exercising/exertion   Pain is reported as 2/10 and 7/10 on pain scale.  General health as reported by patient is good.  Pertinent medical history includes: asthma, kidney disease, numbness/tingling, pain at night/rest and thyroid problems.     Medical allergies: none.   Surgeries include:  Other. Other surgery history details: 2 hernias before 3 years of age. Tendons in right wrist, sewn back together..    Current medications:  Thyroid medication.    Current occupation is Construction.   Primary job tasks include:  Lifting/carrying, prolonged standing and repetitive tasks.                  Therapist Generated HPI Evaluation  Problem details: 10/13/2022.  R shoulder pain.  Can travel down to elbow.  .         Type of problem:  Right shoulder.    This is a chronic condition.  Condition occurred with:  Repetition/overuse.  Where condition occurred: at work.  Patient reports pain:  Anterior, lateral and in the joint.  Pain is described as aching and is constant.  Pain radiates to:  Upper arm. Pain is worse during the day.  Since onset symptoms are gradually improving.  Associated symptoms:  Loss of strength and loss of motion/stiffness. Symptoms are exacerbated by lifting and using arm behind back (lift gallon of milk away from body)  Relieved by: rest arm overhead;  ibuprofen.  Special tests included:  X-ray (normal ).    Restrictions due to condition include:  Working in normal job without restrictions.                          Objective:  Standing Alignment:    Cervical/Thoracic:  Normal  Shoulder/UE:  Normal                                       Shoulder Evaluation:  ROM:  AROM:    Flexion:  Left:  Full     Right:  Normal     Abduction:  Left: full   Right:  Normal with end range pain    Internal Rotation:  Left:  L2    Right:  Pain at belt line                        Strength:    Flexion: Right: 5-/5      Pain:  -/+  Extension:  Right: 5/5     Pain:-  Abduction:  Right: 5-/5      Pain:-/+  Adduction:  Right: 5/5      Pain:-  Internal Rotation:  Right: 5/5      Pain:-  External Rotation:   Right:4+/5      Pain:+      Horizontal Adduction:  Right:/5     Pain:-      Stability Testing:  normal      Special Tests:      Right shoulder positive for the following special tests:Impingement  Right shoulder negative for the following special tests:Labral; Bursal and Rotator cuff tear  Palpation:  Palpation assessed shoulder: denies tenderness.      Mobility Tests:  normal                                                 General     ROS    Assessment/Plan:    Patient is a 58 year old male with right side shoulder complaints.    Patient has the following significant findings with corresponding treatment plan.                Diagnosis 1:  R shoulder impingement  Pain -  self management, education, directional preference exercise and home program  Decreased ROM/flexibility - manual therapy and therapeutic exercise  Decreased strength - therapeutic exercise and therapeutic activities  Impaired muscle performance - neuro re-education  Decreased function - therapeutic activities    Therapy Evaluation Codes:   1) History comprised of:   Personal factors that impact the plan of care:      None.    Comorbidity factors that impact the plan of care are:      Asthma, Numbness/tingling and Pain at night/rest.     Medications impacting care: None.  2) Examination of Body Systems comprised of:   Body structures and functions that impact the plan of care:      Shoulder.   Activity limitations that impact the plan of care are:      Lifting.  3) Clinical presentation characteristics are:   Stable/Uncomplicated.  4) Decision-Making    Low complexity  using standardized patient assessment instrument and/or measureable assessment of functional outcome.  Cumulative Therapy Evaluation is: Low complexity.    Previous and current functional limitations:  (See Goal Flow Sheet for this information)    Short term and Long term goals: (See Goal Flow Sheet for this information)     Communication ability:  Patient appears to be able to clearly communicate and understand verbal and written communication and follow directions correctly.  Treatment Explanation - The following has been discussed with the patient:   RX ordered/plan of care  Anticipated outcomes  Possible risks and side effects  This patient would benefit from PT intervention to resume normal activities.   Rehab potential is good.    Frequency:  1 X week, once daily  Duration:  for 1 weeks  Discharge Plan:  Achieve all LTG.  Independent in home treatment program.  Reach maximal therapeutic benefit.    Please refer to the daily flowsheet for treatment today, total treatment time and time spent performing 1:1 timed codes.

## 2023-06-22 PROBLEM — M75.41 IMPINGEMENT SYNDROME, SHOULDER, RIGHT: Status: RESOLVED | Noted: 2023-04-18 | Resolved: 2023-06-22

## 2023-11-27 ENCOUNTER — MYC MEDICAL ADVICE (OUTPATIENT)
Dept: FAMILY MEDICINE | Facility: CLINIC | Age: 58
End: 2023-11-27
Payer: COMMERCIAL

## 2024-01-04 ENCOUNTER — PATIENT OUTREACH (OUTPATIENT)
Dept: CARE COORDINATION | Facility: CLINIC | Age: 59
End: 2024-01-04
Payer: COMMERCIAL

## 2024-01-15 ASSESSMENT — ENCOUNTER SYMPTOMS
FREQUENCY: 0
FEVER: 0
PALPITATIONS: 0
MYALGIAS: 1
WEAKNESS: 0
HEADACHES: 0
HEARTBURN: 0
CHILLS: 0
DYSURIA: 0
SHORTNESS OF BREATH: 0
CONSTIPATION: 0
ARTHRALGIAS: 0
ABDOMINAL PAIN: 0
SORE THROAT: 0
COUGH: 0
DIZZINESS: 0
HEMATOCHEZIA: 1
NAUSEA: 0
JOINT SWELLING: 0
NERVOUS/ANXIOUS: 0
PARESTHESIAS: 0
DIARRHEA: 0
EYE PAIN: 1
HEMATURIA: 0

## 2024-01-18 ENCOUNTER — ANCILLARY PROCEDURE (OUTPATIENT)
Dept: GENERAL RADIOLOGY | Facility: CLINIC | Age: 59
End: 2024-01-18
Attending: FAMILY MEDICINE
Payer: COMMERCIAL

## 2024-01-18 ENCOUNTER — PATIENT OUTREACH (OUTPATIENT)
Dept: CARE COORDINATION | Facility: CLINIC | Age: 59
End: 2024-01-18

## 2024-01-18 ENCOUNTER — OFFICE VISIT (OUTPATIENT)
Dept: ORTHOPEDICS | Facility: CLINIC | Age: 59
End: 2024-01-18
Payer: COMMERCIAL

## 2024-01-18 VITALS
BODY MASS INDEX: 34.44 KG/M2 | DIASTOLIC BLOOD PRESSURE: 93 MMHG | SYSTOLIC BLOOD PRESSURE: 137 MMHG | WEIGHT: 215 LBS | HEART RATE: 76 BPM

## 2024-01-18 DIAGNOSIS — M25.511 CHRONIC RIGHT SHOULDER PAIN: ICD-10-CM

## 2024-01-18 DIAGNOSIS — M54.12 CERVICAL RADICULOPATHY: ICD-10-CM

## 2024-01-18 DIAGNOSIS — M54.12 CERVICAL RADICULOPATHY: Primary | ICD-10-CM

## 2024-01-18 DIAGNOSIS — G89.29 CHRONIC RIGHT SHOULDER PAIN: ICD-10-CM

## 2024-01-18 PROCEDURE — 99214 OFFICE O/P EST MOD 30 MIN: CPT | Performed by: FAMILY MEDICINE

## 2024-01-18 PROCEDURE — 72040 X-RAY EXAM NECK SPINE 2-3 VW: CPT | Mod: TC | Performed by: RADIOLOGY

## 2024-01-18 RX ORDER — CYCLOBENZAPRINE HCL 10 MG
10 TABLET ORAL
Qty: 30 TABLET | Refills: 0 | Status: SHIPPED | OUTPATIENT
Start: 2024-01-18

## 2024-01-18 RX ORDER — METHYLPREDNISOLONE 4 MG
TABLET, DOSE PACK ORAL
Qty: 21 TABLET | Refills: 0 | Status: SHIPPED | OUTPATIENT
Start: 2024-01-18

## 2024-01-18 NOTE — PROGRESS NOTES
ASSESSMENT & PLAN    Rony was seen today for pain.    Diagnoses and all orders for this visit:    Cervical radiculopathy  -     XR Cervical Spine 2/3 vws; Future  -     methylPREDNISolone (MEDROL DOSEPAK) 4 MG tablet therapy pack; Follow Package Directions  -     cyclobenzaprine (FLEXERIL) 10 MG tablet; Take 1 tablet (10 mg) by mouth nightly as needed for muscle spasms    Chronic right shoulder pain  -     methylPREDNISolone (MEDROL DOSEPAK) 4 MG tablet therapy pack; Follow Package Directions  -     cyclobenzaprine (FLEXERIL) 10 MG tablet; Take 1 tablet (10 mg) by mouth nightly as needed for muscle spasms      This issue is acute on chronic and Worsening.     # Acute on Chronic Right Shoulder Pain/Neck pain: Alvarez Subramanian  was seen today for right neck, shoulder pain. Symptoms had been going on for 1.5 years. On examination there are positive findings of mild tenderness to palpation over the paracervical muscles on the right side, worsening symptoms with cervical nerve compression on right side, no significant findings on right shoulder examination. Imaging findings showed mild degeneration in the cervical spine, reviewed previous right shoulder x-rays. Likely cause of patient's condition due to right cervical radiculopathy. Other possible conditions contributing to symptoms include irritated rotator cuff tendon.  He has been going to chiropractic and massage therapy with some improvement of his symptoms.  Counseled patient on nature of condition and treatment options.  Given this plan as below, follow-up 1 month if not improving     Image Findings: cervical x-rays mild degeneration, right shoulder x-rays  Treatment: Activities as tolerated, home exercises given today  Job: As tolerated  Medications/Injections: Limited tylenol/ibuprofen for pain for 1-2 weeks, Topical Voltaren gel, defer for now  Follow-up: In one month if symptoms do not improve, sooner if worsening  Can consider further imaging, PT, shoulder  steroid injection    Polo Penn MD  [facility] [place]    -----  Chief Complaint   Patient presents with    Right Shoulder - Pain       SUBJECTIVE  Alvarez Subramanian is a/an 58 year old male who is seen as a self referral for evaluation of right shoulder pain.     The patient is seen by themselves.  The patient is Left handed    Onset: 1.5 years(s) ago. Reports insidious onset without acute precipitating event.   Location of Pain: biceps tendon, anterior/lateral shoulder   Worsened by: putting on his coat, rest - becomes stiff   Treatments tried: massage, chiropractor, x-rays completed 4/1/23, physical therapy helped, but to a certain point.   Associated symptoms: dull pain   Estim neck/shoulder  Has pain in shoulder blade with numbness.  Pain at night sometimes, has to put arm up   Orthopedic/Surgical history: NO  Social History/Occupation:      No family history pertinent to patient's problem today.     REVIEW OF SYSTEMS:  Review of Systems  Constitutional, HEENT, cardiovascular, pulmonary, gi and gu systems are negative, except as otherwise noted.    OBJECTIVE:  BP (!) 137/93   Pulse 76   Wt 97.5 kg (215 lb)   BMI (P) 34.44 kg/m     General: healthy, alert and in no distress  HEENT: no scleral icterus or conjunctival erythema  Skin: no suspicious lesions or rash. No jaundice.  CV: distal perfusion intact    Resp: normal respiratory effort without conversational dyspnea   Psych: normal mood and affect  Gait: normal steady gait with appropriate coordination and balance    Neuro: Normal light sensory exam of right upper extremity      Ortho Exam   RIGHT SHOULDER  Inspection:    no swelling, bruising, discoloration, or obvious deformity or asymmetry  Palpation:    Tender about the proximal biceps tendon. Remainder of bony and tendinous landmarks are nontender.  Active Range of Motion:     Abduction 1800, FF 1800, , IR L1.    Strength:    Scapular plane abduction  5/5,  ER 5/5, IR 5/5, biceps 5/5, triceps 5/5  Special Tests:    Positive: None    Negative: Neer's, Abraham', and supraspinatus (empty can)    CERVICAL SPINE  Inspection:    normal cervical lordosis present, rounded shoulders, forward head posture  Palpation:    Nontender.  Range of Motion:     Flexion full    Extension full    Right side bend full    Left side bend full    Right rotation full    Left rotation full  Strength:    Full strength throughout all neck muscles  Special Tests:    Positive: None    Negative: Spurling's (bilateral)    RADIOLOGY:  I independently, visualized and reviewed these images with the patient  Right shoulder x-rays  Cervical x-rays showing mild degeneration    IMPRESSION: No fractures are evident. Normal glenohumeral alignment. The acromioclavicular joint is unremarkable.     Review of external notes as documented elsewhere in note  Review of the result(s) of each unique test - right shoulder x-rays, cervical x-rays       Disclaimer: This note consists of symbols derived from keyboarding, dictation and/or voice recognition software. As a result, there may be errors in the script that have gone undetected. Please consider this when interpreting information found in this chart.

## 2024-01-18 NOTE — PATIENT INSTRUCTIONS
# Acute on Chronic Right Shoulder Pain/Neck pain: Alvarez Subramanian  was seen today for right neck, shoulder pain. Symptoms had been going on for 1.5 years. On examination there are positive findings of mild tenderness to palpation over the paracervical muscles on the right side, worsening symptoms with cervical nerve compression on right side, no significant findings on right shoulder examination. Imaging findings showed mild degeneration in the cervical spine, reviewed previous right shoulder x-rays. Likely cause of patient's condition due to right cervical radiculopathy. Other possible conditions contributing to symptoms include irritated rotator cuff tendon.  He has been going to chiropractic and massage therapy with some improvement of his symptoms.  Counseled patient on nature of condition and treatment options.  Given this plan as below, follow-up 1 month if not improving     Image Findings: cervical x-rays mild degeneration, right shoulder x-rays  Treatment: Activities as tolerated, home exercises given today  Job: As tolerated  Medications/Injections: Limited tylenol/ibuprofen for pain for 1-2 weeks, Topical Voltaren gel, defer for now  Follow-up: In one month if symptoms do not improve, sooner if worsening  Can consider further imaging, PT, shoulder steroid injection    Please call [phone]   Ask for my team if you have any questions or concerns    If you have not yet received the influenza vaccine but would like to get one, please call  [phone] or you can schedule via [employer]    It was great seeing you again today!    Polo Penn MD, [facility]

## 2024-01-18 NOTE — LETTER
1/18/2024         RE: Alvarez Subramanian  [address]  [facility] [address]        Dear Colleague,    Thank you for referring your patient, Alvarez Subramanian, to the [facility] [place]. Please see a copy of my visit note below.    ASSESSMENT & PLAN    Rony was seen today for pain.    Diagnoses and all orders for this visit:    Cervical radiculopathy  -     XR Cervical Spine 2/3 vws; Future  -     methylPREDNISolone (MEDROL DOSEPAK) 4 MG tablet therapy pack; Follow Package Directions  -     cyclobenzaprine (FLEXERIL) 10 MG tablet; Take 1 tablet (10 mg) by mouth nightly as needed for muscle spasms    Chronic right shoulder pain  -     methylPREDNISolone (MEDROL DOSEPAK) 4 MG tablet therapy pack; Follow Package Directions  -     cyclobenzaprine (FLEXERIL) 10 MG tablet; Take 1 tablet (10 mg) by mouth nightly as needed for muscle spasms      This issue is acute on chronic and Worsening.     # Acute on Chronic Right Shoulder Pain/Neck pain: Alvarez Subramanian  was seen today for right neck, shoulder pain. Symptoms had been going on for 1.5 years. On examination there are positive findings of mild tenderness to palpation over the paracervical muscles on the right side, worsening symptoms with cervical nerve compression on right side, no significant findings on right shoulder examination. Imaging findings showed mild degeneration in the cervical spine, reviewed previous right shoulder x-rays. Likely cause of patient's condition due to right cervical radiculopathy. Other possible conditions contributing to symptoms include irritated rotator cuff tendon.  He has been going to chiropractic and massage therapy with some improvement of his symptoms.  Counseled patient on nature of condition and treatment options.  Given this plan as below, follow-up 1 month if not improving     Image Findings: cervical x-rays mild degeneration, right shoulder x-rays  Treatment: Activities as tolerated, home  exercises given today  Job: As tolerated  Medications/Injections: Limited tylenol/ibuprofen for pain for 1-2 weeks, Topical Voltaren gel, defer for now  Follow-up: In one month if symptoms do not improve, sooner if worsening  Can consider further imaging, PT, shoulder steroid injection    Polo Penn MD  [facility] [place]    -----  Chief Complaint   Patient presents with     Right Shoulder - Pain       SUBJECTIVE  Alvarez Subramanian is a/an 58 year old male who is seen as a self referral for evaluation of right shoulder pain.     The patient is seen by themselves.  The patient is Left handed    Onset: 1.5 years(s) ago. Reports insidious onset without acute precipitating event.   Location of Pain: biceps tendon, anterior/lateral shoulder   Worsened by: putting on his coat, rest - becomes stiff   Treatments tried: massage, chiropractor, x-rays completed 4/1/23, physical therapy helped, but to a certain point.   Associated symptoms: dull pain   Estim neck/shoulder  Has pain in shoulder blade with numbness.  Pain at night sometimes, has to put arm up   Orthopedic/Surgical history: NO  Social History/Occupation:      No family history pertinent to patient's problem today.     REVIEW OF SYSTEMS:  Review of Systems  Constitutional, HEENT, cardiovascular, pulmonary, gi and gu systems are negative, except as otherwise noted.    OBJECTIVE:  BP (!) 137/93   Pulse 76   Wt 97.5 kg (215 lb)   BMI (P) 34.44 kg/m     General: healthy, alert and in no distress  HEENT: no scleral icterus or conjunctival erythema  Skin: no suspicious lesions or rash. No jaundice.  CV: distal perfusion intact    Resp: normal respiratory effort without conversational dyspnea   Psych: normal mood and affect  Gait: normal steady gait with appropriate coordination and balance    Neuro: Normal light sensory exam of right upper extremity      Ortho Exam   RIGHT SHOULDER  Inspection:    no swelling, bruising,  discoloration, or obvious deformity or asymmetry  Palpation:    Tender about the proximal biceps tendon. Remainder of bony and tendinous landmarks are nontender.  Active Range of Motion:     Abduction 1800, FF 1800, , IR L1.    Strength:    Scapular plane abduction 5/5,  ER 5/5, IR 5/5, biceps 5/5, triceps 5/5  Special Tests:    Positive: None    Negative: Neer's, Abraham', and supraspinatus (empty can)    CERVICAL SPINE  Inspection:    normal cervical lordosis present, rounded shoulders, forward head posture  Palpation:    Nontender.  Range of Motion:     Flexion full    Extension full    Right side bend full    Left side bend full    Right rotation full    Left rotation full  Strength:    Full strength throughout all neck muscles  Special Tests:    Positive: None    Negative: Spurling's (bilateral)    RADIOLOGY:  I independently, visualized and reviewed these images with the patient  Right shoulder x-rays  Cervical x-rays showing mild degeneration    IMPRESSION: No fractures are evident. Normal glenohumeral alignment. The acromioclavicular joint is unremarkable.     Review of external notes as documented elsewhere in note  Review of the result(s) of each unique test - right shoulder x-rays, cervical x-rays       Disclaimer: This note consists of symbols derived from keyboarding, dictation and/or voice recognition software. As a result, there may be errors in the script that have gone undetected. Please consider this when interpreting information found in this chart.      Again, thank you for allowing me to participate in the care of your patient.        Sincerely,        Polo Penn MD

## 2024-01-22 ENCOUNTER — OFFICE VISIT (OUTPATIENT)
Dept: FAMILY MEDICINE | Facility: CLINIC | Age: 59
End: 2024-01-22
Payer: COMMERCIAL

## 2024-01-22 VITALS
HEART RATE: 59 BPM | SYSTOLIC BLOOD PRESSURE: 131 MMHG | WEIGHT: 213.4 LBS | DIASTOLIC BLOOD PRESSURE: 80 MMHG | BODY MASS INDEX: 34.3 KG/M2 | OXYGEN SATURATION: 94 % | TEMPERATURE: 97.7 F | HEIGHT: 66 IN | RESPIRATION RATE: 18 BRPM

## 2024-01-22 DIAGNOSIS — Z13.220 SCREENING FOR LIPID DISORDERS: ICD-10-CM

## 2024-01-22 DIAGNOSIS — R79.89 ELEVATED SERUM CREATININE: ICD-10-CM

## 2024-01-22 DIAGNOSIS — R06.02 SOB (SHORTNESS OF BREATH): ICD-10-CM

## 2024-01-22 DIAGNOSIS — N48.6 PEYRONIE'S SYNDROME: ICD-10-CM

## 2024-01-22 DIAGNOSIS — Z00.00 ANNUAL PHYSICAL EXAM: Primary | ICD-10-CM

## 2024-01-22 DIAGNOSIS — E03.9 HYPOTHYROIDISM, UNSPECIFIED TYPE: ICD-10-CM

## 2024-01-22 DIAGNOSIS — Z12.11 SCREEN FOR COLON CANCER: ICD-10-CM

## 2024-01-22 DIAGNOSIS — Z13.1 SCREENING FOR DIABETES MELLITUS: ICD-10-CM

## 2024-01-22 LAB
ALBUMIN SERPL BCG-MCNC: 4.4 G/DL (ref 3.5–5.2)
ALP SERPL-CCNC: 100 U/L (ref 40–150)
ALT SERPL W P-5'-P-CCNC: 38 U/L (ref 0–70)
ANION GAP SERPL CALCULATED.3IONS-SCNC: 10 MMOL/L (ref 7–15)
AST SERPL W P-5'-P-CCNC: 29 U/L (ref 0–45)
BILIRUB SERPL-MCNC: 0.3 MG/DL
BUN SERPL-MCNC: 21.4 MG/DL (ref 6–20)
CALCIUM SERPL-MCNC: 9.7 MG/DL (ref 8.6–10)
CHLORIDE SERPL-SCNC: 103 MMOL/L (ref 98–107)
CHOLEST SERPL-MCNC: 140 MG/DL
CREAT SERPL-MCNC: 1.19 MG/DL (ref 0.67–1.17)
DEPRECATED HCO3 PLAS-SCNC: 26 MMOL/L (ref 22–29)
EGFRCR SERPLBLD CKD-EPI 2021: 71 ML/MIN/1.73M2
FASTING STATUS PATIENT QL REPORTED: YES
GLUCOSE SERPL-MCNC: 98 MG/DL (ref 70–99)
HDLC SERPL-MCNC: 46 MG/DL
LDLC SERPL CALC-MCNC: 64 MG/DL
NONHDLC SERPL-MCNC: 94 MG/DL
POTASSIUM SERPL-SCNC: 4.2 MMOL/L (ref 3.4–5.3)
PROT SERPL-MCNC: 6.9 G/DL (ref 6.4–8.3)
SODIUM SERPL-SCNC: 139 MMOL/L (ref 135–145)
TRIGL SERPL-MCNC: 149 MG/DL
TSH SERPL DL<=0.005 MIU/L-ACNC: 0.79 UIU/ML (ref 0.3–4.2)

## 2024-01-22 PROCEDURE — 84443 ASSAY THYROID STIM HORMONE: CPT

## 2024-01-22 PROCEDURE — 80053 COMPREHEN METABOLIC PANEL: CPT

## 2024-01-22 PROCEDURE — 80061 LIPID PANEL: CPT

## 2024-01-22 PROCEDURE — 99396 PREV VISIT EST AGE 40-64: CPT

## 2024-01-22 PROCEDURE — 36415 COLL VENOUS BLD VENIPUNCTURE: CPT

## 2024-01-22 PROCEDURE — 99214 OFFICE O/P EST MOD 30 MIN: CPT | Mod: 25

## 2024-01-22 RX ORDER — FLUTICASONE PROPIONATE AND SALMETEROL 100; 50 UG/1; UG/1
1 POWDER RESPIRATORY (INHALATION) 2 TIMES DAILY PRN
Qty: 60 EACH | Refills: 1 | Status: SHIPPED | OUTPATIENT
Start: 2024-01-22

## 2024-01-22 ASSESSMENT — ENCOUNTER SYMPTOMS
CONSTIPATION: 0
DIARRHEA: 0
NAUSEA: 0
DYSURIA: 0
EYE PAIN: 1
CHILLS: 0
PALPITATIONS: 0
HEMATURIA: 0
SORE THROAT: 0
SHORTNESS OF BREATH: 0
HEADACHES: 0
MYALGIAS: 1
ARTHRALGIAS: 0
FREQUENCY: 0
WEAKNESS: 0
DIZZINESS: 0
FEVER: 0
NERVOUS/ANXIOUS: 0
ABDOMINAL PAIN: 0
JOINT SWELLING: 0
COUGH: 0

## 2024-01-22 ASSESSMENT — PAIN SCALES - GENERAL: PAINLEVEL: NO PAIN (0)

## 2024-01-22 NOTE — PROGRESS NOTES
"Preventive Care Visit  [facility] MARTINA Jennings CNP, Family Medicine  Jan 22, 2024      SUBJECTIVE:   Rony is a 58 year old, presenting for the following:  Physical    Pt. States previous year has been largely uneventful. States mental health is doing well. Has adequate support networks in place. No new family hx of CA, early death, or cardiac disease.     Patient acutely concerned his thyroid medication is too high.  Endorses occasional night sweats, anxiety on 160 of Thyroid  [place] patient reports he has a strong preference for this formulation over levothyroxine.  He reports he was told by previous provider that this would be a better formulation for him but does not recall the specific reason.  He notes that he self reduced from 180->120 for several weeks and noted a resolution of symptoms. He has resumed his 180 dose appox 1 month ago to \"make sure the test was accurate for 180\"    Additionally of note, patient taking Advair.  He reports he is not sure why this is.  He reports doing pulmonary function testing after his mother passed approximately 4 years ago.  He reports that the clinic in [place] told him he needed an inhaler but he does not recall what the diagnosis was.  Distant smoking history, approximately 8 pack years total.  Works as a .  No new SOB, wheezing, CP, palpitations, chronic cough, activity intolerance.  Has been compliant with inhaler daily    Endorses some blood in the stool.  Describes this as several drops of bright red blood on occasion.  Denies-gross blood in stool, dark tarry black stool, abdominal pain with bowel movement, weight loss      Healthy Habits:     Getting at least 3 servings of Calcium per day:  Yes    Bi-annual eye exam:  Yes    Dental care twice a year:  NO    Sleep apnea or symptoms of sleep apnea:  None    Diet:  Regular (no restrictions)    Frequency of exercise:  None    Taking medications regularly:  Yes    Medication side " "effects:  None    Additional concerns today:  No      Today's PHQ-2 Score:       1/22/2024     6:23 AM   PHQ-2 ( 1999 [employer])   Q1: Little interest or pleasure in doing things 0   Q2: Feeling down, depressed or hopeless 1   PHQ-2 Score 1   Q1: Little interest or pleasure in doing things Not at all   Q2: Feeling down, depressed or hopeless Several days   PHQ-2 Score 1       Social History     Tobacco Use    Smoking status: Former     Packs/day: 1.00     Years: 10.00     Additional pack years: 0.00     Total pack years: 10.00     Types: Cigarettes    Smokeless tobacco: Never   Substance Use Topics    Alcohol use: Yes     Comment: 2 drinks/week           1/15/2024    10:57 AM   Alcohol Use   Prescreen: >3 drinks/day or >7 drinks/week? No       Last PSA: No results found for: \"PSA\"    Reviewed orders with patient. Reviewed health maintenance and updated orders accordingly - Yes  Lab work is in process  Labs reviewed in EPIC    Reviewed and updated as needed this visit by clinical staff   Tobacco  Allergies  Meds      Soc Hx        Reviewed and updated as needed this visit by Provider                    Review of Systems   Constitutional:  Negative for chills and fever.   HENT:  Positive for hearing loss. Negative for congestion, ear pain and sore throat.    Eyes:  Positive for pain. Negative for visual disturbance.   Respiratory:  Negative for cough and shortness of breath.    Cardiovascular:  Negative for chest pain and palpitations.   Gastrointestinal:  Negative for abdominal pain, constipation, diarrhea and nausea.   Genitourinary:  Negative for dysuria, frequency, genital sores, hematuria, penile discharge and urgency.   Musculoskeletal:  Positive for myalgias. Negative for arthralgias and joint swelling.   Skin:  Negative for rash.   Neurological:  Negative for dizziness, weakness and headaches.   Psychiatric/Behavioral:  The patient is not nervous/anxious.        Review of Systems  Constitutional, HEENT, " "cardiovascular, pulmonary, gi and gu systems are negative, except as otherwise noted.    OBJECTIVE:   /80   Pulse 59   Temp 97.7  F (36.5  C) (Oral)   Resp 18   Ht 1.68 m (5' 6.14\")   Wt 96.8 kg (213 lb 6.4 oz)   SpO2 94%   BMI 34.30 kg/m     Estimated body mass index is 34.3 kg/m  as calculated from the following:    Height as of this encounter: 1.68 m (5' 6.14\").    Weight as of this encounter: 96.8 kg (213 lb 6.4 oz).  Physical Exam  GENERAL: alert and no distress  RESP: lungs clear to auscultation - no rales, rhonchi or wheezes  CV: regular rate and rhythm, normal S1 S2,no murmur, click or rub, no peripheral edema noted  ABDOMEN: soft, nontender, bowel sounds normal  MS: no gross musculoskeletal defects noted, no edema    Diagnostic Test Results:  Labs reviewed in Epic    ASSESSMENT/PLAN:   Annual physical exam      Hypothyroidism, unspecified type  Has been on thyroid [place] 180 for approximately 1 month  - TSH with free T4 reflex    SOB (shortness of breath)  Chronic, unsure of diagnosis, PFTs done several years ago results not available  - fluticasone-salmeterol (ADVAIR) 100-50 MCG/ACT inhaler  Dispense: 60 each; Refill: 1  - Pulmonary Function Test    Screen for colon cancer  - Colonoscopy Screening  Referral    Screening for diabetes mellitus  - Comprehensive metabolic panel    Screening for lipid disorders  - Lipid panel reflex to direct LDL Fasting    Peyronie's syndrome  Stable, continue to engage with urology for primary management      Counseling  Reviewed preventive health counseling, as reflected in patient instructions      BMI  Estimated body mass index is 34.3 kg/m  as calculated from the following:    Height as of this encounter: 1.68 m (5' 6.14\").    Weight as of this encounter: 96.8 kg (213 lb 6.4 oz).       He reports that he has quit smoking. His smoking use included cigarettes. He has a 10 pack-year smoking history. He has never used smokeless tobacco.            Signed " Electronically by: MARTINA Mendoza CNP

## 2024-01-23 ENCOUNTER — TELEPHONE (OUTPATIENT)
Dept: FAMILY MEDICINE | Facility: CLINIC | Age: 59
End: 2024-01-23
Payer: COMMERCIAL

## 2024-01-23 NOTE — TELEPHONE ENCOUNTER
RN called patient at  [phone]     VM that of a business so did not leave message.    RN will try again later.    Francis Velasco, RN, BSN, PHN  [facility]

## 2024-01-23 NOTE — TELEPHONE ENCOUNTER
----- Message from MARTINA Colin CNP sent at 1/22/2024  8:13 PM CST -----  Please call pt with lab result. Schedule lab only for 1-2 weeks    Your lipid panel is excellent, no further action is required at this time.    Thyroid levels are normal currently. However, if you only recently returned to the 180 dose of thyroid armour, it could take from 6-8 weeks to reflect on the lab. At this time I would recommend you maintain the current dose if your symptoms return we should test it again for a more accurate refection.     Finally, your metabolic panel showed a very slight increase in creatinine and BUN. This can often be from dehydration when fasting. I recommend we repeat this in a week or two when you are well hydrated. Please let me know if you have any questions or concerns.

## 2024-01-25 DIAGNOSIS — E03.9 HYPOTHYROIDISM, UNSPECIFIED TYPE: ICD-10-CM

## 2024-01-25 RX ORDER — THYROID 120 MG/1
120 TABLET ORAL DAILY
Qty: 90 TABLET | Refills: 0 | Status: SHIPPED | OUTPATIENT
Start: 2024-01-25

## 2024-01-25 RX ORDER — THYROID 60 MG/1
60 TABLET ORAL DAILY
Qty: 90 TABLET | Refills: 0 | Status: SHIPPED | OUTPATIENT
Start: 2024-01-25

## 2024-01-31 ENCOUNTER — MYC MEDICAL ADVICE (OUTPATIENT)
Dept: FAMILY MEDICINE | Facility: CLINIC | Age: 59
End: 2024-01-31
Payer: COMMERCIAL

## 2024-01-31 ENCOUNTER — TELEPHONE (OUTPATIENT)
Dept: GASTROENTEROLOGY | Facility: CLINIC | Age: 59
End: 2024-01-31
Payer: COMMERCIAL

## 2024-01-31 DIAGNOSIS — Z12.11 SPECIAL SCREENING FOR MALIGNANT NEOPLASMS, COLON: Primary | ICD-10-CM

## 2024-01-31 DIAGNOSIS — E03.9 HYPOTHYROIDISM, UNSPECIFIED TYPE: Primary | ICD-10-CM

## 2024-01-31 RX ORDER — LEVOTHYROXINE SODIUM 200 UG/1
200 TABLET ORAL DAILY
Qty: 60 TABLET | Refills: 0 | Status: SHIPPED | OUTPATIENT
Start: 2024-01-31 | End: 2024-04-09

## 2024-01-31 NOTE — TELEPHONE ENCOUNTER
"Endoscopy Scheduling Screen    Have you had a positive Covid test in the last 14 days?  No    Are you active on MyChart?   Yes    What insurance is in the chart?  Other:  [employer]    Ordering/Referring Provider:   JANETTE GOEL        (If ordering provider performs procedure, schedule with ordering provider unless otherwise instructed. )    BMI: Estimated body mass index is 34.3 kg/m  as calculated from the following:    Height as of 1/22/24: 1.68 m (5' 6.14\").    Weight as of 1/22/24: 96.8 kg (213 lb 6.4 oz).     Sedation Ordered  moderate sedation.   If patient BMI > 50 do not schedule in [facility].    If patient BMI > 45 do not schedule at [facility].    Are you taking methadone or Suboxone?  No    Have you had difficulties, pain, or discomfort during past endoscopy procedures?  No    Are you taking any prescription medications for pain 3 or more times per week?   NO - No RN review required.    Do you have a history of malignant hyperthermia or adverse reaction to anesthesia?  No    (Females) Are you currently pregnant?   No     Have you been diagnosed or told you have pulmonary hypertension?   No    Do you have an LVAD?  No    Have you been told you have moderate to severe sleep apnea?  No    Have you been told you have COPD, asthma, or any other lung disease?  No    Do you have any heart conditions?  No     Have you ever had an organ transplant?   No    Have you ever had or are you awaiting a heart or lung transplant?   No    Have you had a stroke or transient ischemic attack (TIA aka \"mini stroke\" in the last 6 months?   No    Have you been diagnosed with or been told you have cirrhosis of the liver?   No    Are you currently on dialysis?   No    Do you need assistance transferring?   No    BMI: Estimated body mass index is 34.3 kg/m  as calculated from the following:    Height as of 1/22/24: 1.68 m (5' 6.14\").    Weight as of 1/22/24: 96.8 kg (213 lb 6.4 oz).     Is patients BMI > 40 and scheduling location [facility]?  No    Do you " take an injectable medication for weight loss or diabetes (excluding insulin)?  No    Do you take the medication Naltrexone?  No    Do you take blood thinners?  No       Prep   Are you currently on dialysis or do you have chronic kidney disease?  Yes (Golytely Prep)    Do you have a diagnosis of diabetes?  No    Do you have a diagnosis of cystic fibrosis (CF)?  No    On a regular basis do you go 3 -5 days between bowel movements?  No    BMI > 40?  No    Preferred Pharmacy:    [id number] - [place], [place] - [address]  [address] [facility] [address]  Phone: [phone] Fax: [phone]    [facility] [place], [place] - [address]  [address]  [address]  [facility] [address]  Phone: [phone] Fax: [phone]    [facility] - [place], [place] - [address]  [address]  [facility] [address]  Phone: [phone] Fax: [phone]    [facility] [place], [place] - [address]  [address]  [facility] [address]  Phone: [phone] Fax: [phone] Alternate Fax: [phone], [phone]      Final Scheduling Details   Colonoscopy prep sent?  N/A    Procedure scheduled  Colonoscopy    Surgeon:  Sanjeev     Date of procedure:  3/27     Pre-OP / PAC:   No - Not required for this site.    Location  [facility] - [facility] - Patient preference.    Sedation   Moderate Sedation - Per order.      Patient Reminders:   You will receive a call from a Nurse to review instructions and health history.  This assessment must be completed prior to your procedure.  Failure to complete the Nurse assessment may result in the procedure being cancelled.      On the day of your procedure, please designate an adult(s) who can drive you home stay with you for the next 24 hours. The medicines used in the exam will make you sleepy. You will not be able to drive.      You cannot take  public transportation, ride share services, or non-medical taxi service without a responsible caregiver.  Medical transport services are allowed with the requirement that a responsible caregiver will receive you at your destination.  We require that drivers and caregivers are confirmed prior to your procedure.

## 2024-01-31 NOTE — TELEPHONE ENCOUNTER
Spoke with pt over phone, reports insurance no longer covering thyroid armor, would like to return to synthroid. Was previously only taking 120 tab ( and 60).     Will resume synthroid at 200mcg and retest in 6 weeks.

## 2024-02-20 ENCOUNTER — OFFICE VISIT (OUTPATIENT)
Dept: NURSING | Facility: CLINIC | Age: 59
End: 2024-02-20
Payer: COMMERCIAL

## 2024-02-20 VITALS — HEART RATE: 73 BPM | OXYGEN SATURATION: 96 % | WEIGHT: 212.9 LBS | BODY MASS INDEX: 34.22 KG/M2

## 2024-02-20 DIAGNOSIS — R06.02 SOB (SHORTNESS OF BREATH): ICD-10-CM

## 2024-02-20 PROCEDURE — 94729 DIFFUSING CAPACITY: CPT | Performed by: INTERNAL MEDICINE

## 2024-02-20 PROCEDURE — 94060 EVALUATION OF WHEEZING: CPT | Performed by: INTERNAL MEDICINE

## 2024-02-20 PROCEDURE — 94726 PLETHYSMOGRAPHY LUNG VOLUMES: CPT | Performed by: INTERNAL MEDICINE

## 2024-02-21 LAB
DLCOUNC-%PRED-PRE: 92 %
DLCOUNC-PRE: 23.06 ML/MIN/MMHG
DLCOUNC-PRED: 25.04 ML/MIN/MMHG
ERV-%PRED-PRE: 37 %
ERV-PRE: 0.52 L
ERV-PRED: 1.39 L
EXPTIME-PRE: 8.18 SEC
FEF2575-%PRED-POST: 96 %
FEF2575-%PRED-PRE: 58 %
FEF2575-POST: 2.56 L/SEC
FEF2575-PRE: 1.56 L/SEC
FEF2575-PRED: 2.65 L/SEC
FEFMAX-%PRED-PRE: 80 %
FEFMAX-PRE: 6.86 L/SEC
FEFMAX-PRED: 8.53 L/SEC
FEV1-%PRED-PRE: 83 %
FEV1-PRE: 2.51 L
FEV1FEV6-PRE: 70 %
FEV1FEV6-PRED: 79 %
FEV1FVC-PRE: 66 %
FEV1FVC-PRED: 79 %
FEV1SVC-PRE: 60 %
FEV1SVC-PRED: 73 %
FIFMAX-PRE: 4.78 L/SEC
FRCPLETH-%PRED-PRE: 91 %
FRCPLETH-PRE: 3.12 L
FRCPLETH-PRED: 3.39 L
FVC-%PRED-PRE: 99 %
FVC-PRE: 3.78 L
FVC-PRED: 3.81 L
IC-%PRED-PRE: 130 %
IC-PRE: 3.65 L
IC-PRED: 2.79 L
RVPLETH-%PRED-PRE: 113 %
RVPLETH-PRE: 2.6 L
RVPLETH-PRED: 2.28 L
TLCPLETH-%PRED-PRE: 105 %
TLCPLETH-PRE: 6.77 L
TLCPLETH-PRED: 6.42 L
VA-%PRED-PRE: 105 %
VA-PRE: 6.11 L
VC-%PRED-PRE: 100 %
VC-PRE: 4.18 L
VC-PRED: 4.14 L

## 2024-03-11 RX ORDER — BISACODYL 5 MG/1
TABLET, DELAYED RELEASE ORAL
Qty: 4 TABLET | Refills: 0 | Status: SHIPPED | OUTPATIENT
Start: 2024-03-11

## 2024-03-11 NOTE — TELEPHONE ENCOUNTER
Standard Golytely Bowel Prep. Instructions were sent via [email or web address]. Bowel prep was sent 3/11/2024 to    [id number] - [place], [place] - [address] [place] [address]  Marjan Fisher RN on 3/11/2024 at 9:28 AM

## 2024-03-14 ENCOUNTER — TELEPHONE (OUTPATIENT)
Dept: GASTROENTEROLOGY | Facility: CLINIC | Age: 59
End: 2024-03-14
Payer: COMMERCIAL

## 2024-03-14 NOTE — TELEPHONE ENCOUNTER
Patient states prior to being diagnosed with hypothyroidism his PCP mentioned his kidney's suffered due to failing thyroid.   The patient states he answered yes to assessment question due to this.  After discussion, the patient states he is ok with continuing with GolU-Systemsly.      Pre assessment completed for upcoming procedure.    Procedure details:    Arrival time and facility location reviewed.    Pre op exam needed? N/A    Designated  policy reviewed. Instructed to have someone stay 6 hours post procedure.     COVID policy reviewed.      Medication review:    Medications reviewed. Please see supporting documentation below. Holding recommendations discussed (if applicable).       Prep for procedure:     Reviewed procedure prep instructions.     Patient verbalized understanding and had no questions or concerns at this time.        Corinne Kliber, RN  Endoscopy Procedure Pre Assessment RN  [phone] option 4

## 2024-03-14 NOTE — TELEPHONE ENCOUNTER
"Copied from  assessment 1/31/24:  \"Are you currently on dialysis or do you have chronic kidney disease?  Yes (Golytely Prep)\"    Writer is unable to find a diagnosis of chronic kidney disease or dialysis in patient chart.  Recent labs showed elevated Urea Nitrogen and Creatinine, however PCP suggests this may be from dehydration.   Please discuss with patient.    Golytely prep and instructions sent 3/11/24, however, patient may be able to have Miralax prep.    ----------------------------------------------------------------------------------------------------------------------------------    Pre visit planning completed.      Procedure details:    Patient scheduled for Colonoscopy  on 3/27/24.     Arrival time: 0945. Procedure time 1030    Facility location: [facility]; [address], [place] [address]. Check in location: 2nd Floor at Surgery desk.    Sedation type: Conscious sedation     Pre op exam needed? N/A    Indication for procedure: Colon Screening         Chart review:     Electronic implanted devices? No    Recent diagnosis of diverticulitis within the last 6 weeks? No    Diabetic? No      Medication review:    Anticoagulants? No    NSAIDS? Yes.  Ibuprofen (Advil, Motrin).  Holding interval of 1 day before procedure.    Other medication HOLDING recommendations:  N/A      Prep for procedure:     Bowel prep recommendation: discuss with patient - Golytely sent, however may be able to complete Miralax    Due to:  discuss with patient    Prep instructions sent via [employer] on 3/11/24.  New prep instructions may need to be sent after discussion         Corinne Kliber, RN  Endoscopy Procedure Pre Assessment RN  [phone] option 4         "

## 2024-03-19 ENCOUNTER — LAB (OUTPATIENT)
Dept: LAB | Facility: CLINIC | Age: 59
End: 2024-03-19
Payer: COMMERCIAL

## 2024-03-19 DIAGNOSIS — R79.89 ELEVATED SERUM CREATININE: ICD-10-CM

## 2024-03-19 DIAGNOSIS — E03.9 HYPOTHYROIDISM, UNSPECIFIED TYPE: ICD-10-CM

## 2024-03-19 PROCEDURE — 80069 RENAL FUNCTION PANEL: CPT

## 2024-03-19 PROCEDURE — 84443 ASSAY THYROID STIM HORMONE: CPT

## 2024-03-19 PROCEDURE — 36415 COLL VENOUS BLD VENIPUNCTURE: CPT

## 2024-03-20 LAB
ALBUMIN SERPL BCG-MCNC: 4.5 G/DL (ref 3.5–5.2)
ANION GAP SERPL CALCULATED.3IONS-SCNC: 10 MMOL/L (ref 7–15)
BUN SERPL-MCNC: 14.5 MG/DL (ref 8–23)
CALCIUM SERPL-MCNC: 9.3 MG/DL (ref 8.6–10)
CHLORIDE SERPL-SCNC: 105 MMOL/L (ref 98–107)
CREAT SERPL-MCNC: 1.24 MG/DL (ref 0.67–1.17)
DEPRECATED HCO3 PLAS-SCNC: 22 MMOL/L (ref 22–29)
EGFRCR SERPLBLD CKD-EPI 2021: 67 ML/MIN/1.73M2
GLUCOSE SERPL-MCNC: 119 MG/DL (ref 70–99)
PHOSPHATE SERPL-MCNC: 2.8 MG/DL (ref 2.5–4.5)
POTASSIUM SERPL-SCNC: 4.3 MMOL/L (ref 3.4–5.3)
SODIUM SERPL-SCNC: 137 MMOL/L (ref 135–145)
TSH SERPL DL<=0.005 MIU/L-ACNC: 0.52 UIU/ML (ref 0.3–4.2)

## 2024-03-25 RX ORDER — NALOXONE HYDROCHLORIDE 0.4 MG/ML
0.4 INJECTION, SOLUTION INTRAMUSCULAR; INTRAVENOUS; SUBCUTANEOUS
Status: CANCELLED | OUTPATIENT
Start: 2024-03-25

## 2024-03-25 RX ORDER — FLUMAZENIL 0.1 MG/ML
0.2 INJECTION, SOLUTION INTRAVENOUS
Status: CANCELLED | OUTPATIENT
Start: 2024-03-25 | End: 2024-03-25

## 2024-03-25 RX ORDER — NALOXONE HYDROCHLORIDE 0.4 MG/ML
0.2 INJECTION, SOLUTION INTRAMUSCULAR; INTRAVENOUS; SUBCUTANEOUS
Status: CANCELLED | OUTPATIENT
Start: 2024-03-25

## 2024-03-25 RX ORDER — ONDANSETRON 2 MG/ML
4 INJECTION INTRAMUSCULAR; INTRAVENOUS EVERY 6 HOURS PRN
Status: CANCELLED | OUTPATIENT
Start: 2024-03-25

## 2024-03-25 RX ORDER — PROCHLORPERAZINE MALEATE 10 MG
10 TABLET ORAL EVERY 6 HOURS PRN
Status: CANCELLED | OUTPATIENT
Start: 2024-03-25

## 2024-03-25 RX ORDER — ONDANSETRON 4 MG/1
4 TABLET, ORALLY DISINTEGRATING ORAL EVERY 6 HOURS PRN
Status: CANCELLED | OUTPATIENT
Start: 2024-03-25

## 2024-03-27 ENCOUNTER — HOSPITAL ENCOUNTER (OUTPATIENT)
Facility: AMBULATORY SURGERY CENTER | Age: 59
Discharge: HOME | End: 2024-03-27
Attending: STUDENT IN AN ORGANIZED HEALTH CARE EDUCATION/TRAINING PROGRAM | Admitting: STUDENT IN AN ORGANIZED HEALTH CARE EDUCATION/TRAINING PROGRAM
Payer: COMMERCIAL

## 2024-03-27 VITALS
TEMPERATURE: 97.9 F | HEART RATE: 72 BPM | SYSTOLIC BLOOD PRESSURE: 121 MMHG | RESPIRATION RATE: 16 BRPM | DIASTOLIC BLOOD PRESSURE: 69 MMHG | OXYGEN SATURATION: 92 %

## 2024-03-27 DIAGNOSIS — Z12.11 COLON CANCER SCREENING: Primary | ICD-10-CM

## 2024-03-27 LAB — COLONOSCOPY: NORMAL

## 2024-03-27 PROCEDURE — 45385 COLONOSCOPY W/LESION REMOVAL: CPT

## 2024-03-27 PROCEDURE — G8918 PT W/O PREOP ORDER IV AB PRO: HCPCS

## 2024-03-27 PROCEDURE — 88305 TISSUE EXAM BY PATHOLOGIST: CPT | Performed by: PATHOLOGY

## 2024-03-27 PROCEDURE — G8907 PT DOC NO EVENTS ON DISCHARG: HCPCS

## 2024-03-27 RX ORDER — SODIUM CHLORIDE, SODIUM LACTATE, POTASSIUM CHLORIDE, CALCIUM CHLORIDE 600; 310; 30; 20 MG/100ML; MG/100ML; MG/100ML; MG/100ML
INJECTION, SOLUTION INTRAVENOUS CONTINUOUS PRN
Status: COMPLETED | OUTPATIENT
Start: 2024-03-27 | End: 2024-03-27

## 2024-03-27 RX ORDER — FENTANYL CITRATE 50 UG/ML
INJECTION, SOLUTION INTRAMUSCULAR; INTRAVENOUS PRN
Status: DISCONTINUED | OUTPATIENT
Start: 2024-03-27 | End: 2024-03-27 | Stop reason: HOSPADM

## 2024-03-27 RX ORDER — ONDANSETRON 2 MG/ML
4 INJECTION INTRAMUSCULAR; INTRAVENOUS
Status: COMPLETED | OUTPATIENT
Start: 2024-03-27 | End: 2024-03-27

## 2024-03-27 RX ORDER — LIDOCAINE 40 MG/G
CREAM TOPICAL
Status: DISCONTINUED | OUTPATIENT
Start: 2024-03-27 | End: 2024-03-28 | Stop reason: HOSPADM

## 2024-03-27 RX ADMIN — ONDANSETRON 4 MG: 2 INJECTION INTRAMUSCULAR; INTRAVENOUS at 07:08

## 2024-03-27 NOTE — H&P
Pre-Endoscopy History and Physical     Alvarez Subramanian MRN# [id number]   YOB: 1965 Age: 59 year old     Date of Procedure: 3/27/2024  Primary care provider: Soni Francis  Type of Endoscopy: colonoscopy  Reason for Procedure: screening  Type of Anesthesia Anticipated: Moderate Sedation    HPI:    Alvarez is a 59 year old male who will be undergoing the above procedure.      A history and physical has been performed. The patient's medications and allergies have been reviewed. The risks and benefits of the procedure and the sedation options and risks were discussed with the patient.  I specifically discussed about possible sedation medication reaction, bleeding, and one of the more rare complications of perforation.  All questions were answered and informed consent was obtained.      He denies a personal or family history of anesthesia complications or bleeding disorders.     No Known Allergies     Cannot display prior to admission medications because the patient has not been admitted in this contact.       Patient Active Problem List   Diagnosis    Hypothyroidism, unspecified type    Peyronie's syndrome    Low testosterone in male        Past Medical History:   Diagnosis Date    Low testosterone in male     Other specified hypothyroidism     Peyronie's syndrome         Past Surgical History:   Procedure Laterality Date    HERNIA REPAIR Bilateral 1968       Social History     Tobacco Use    Smoking status: Former     Packs/day: 1.00     Years: 10.00     Additional pack years: 0.00     Total pack years: 10.00     Types: Cigarettes    Smokeless tobacco: Never   Substance Use Topics    Alcohol use: Yes     Comment: 2 drinks/week       Family History   Problem Relation Age of Onset    Diabetes Type 2  Mother     Parkinsonism Father     Coronary Artery Disease No family hx of        REVIEW OF SYSTEMS:     5 point ROS negative except as noted above in HPI, including Gen., Resp., CV, GI &  system  "review.      PHYSICAL EXAM:   /78   Pulse 57   Temp 97.9  F (36.6  C) (Temporal)   Resp 16   SpO2 93%  Estimated body mass index is 34.22 kg/m  as calculated from the following:    Height as of 1/22/24: 1.68 m (5' 6.14\").    Weight as of 2/20/24: 96.6 kg (212 lb 14.4 oz).   GENERAL APPEARANCE: healthy, alert, and no distress  MENTAL STATUS: alert  AIRWAY EXAM: Mallampatti Class II (visualization of the soft palate, fauces, and uvula)  RESP: lungs clear to auscultation - no rales, rhonchi or wheezes  CV: regular rates and rhythm, normal S1 S2, no S3 or S4, no murmur, click or rub, and no irregular beats      DIAGNOSTICS:    Not indicated      IMPRESSION   ASA Class 2 - Mild systemic disease        PLAN:       Plan for colonoscopy. We discussed the risks, benefits and alternatives and the patient wished to proceed.    The above has been forwarded to the consulting provider.      Signed Electronically by: ROSA COOPER MD  March 27, 2024    "

## 2024-03-28 LAB
PATH REPORT.COMMENTS IMP SPEC: NORMAL
PATH REPORT.COMMENTS IMP SPEC: NORMAL
PATH REPORT.FINAL DX SPEC: NORMAL
PATH REPORT.GROSS SPEC: NORMAL
PATH REPORT.MICROSCOPIC SPEC OTHER STN: NORMAL
PATH REPORT.RELEVANT HX SPEC: NORMAL
PHOTO IMAGE: NORMAL

## 2024-03-28 NOTE — RESULT ENCOUNTER NOTE
Alvarez,    The type of polyp that was removed during your colonoscopy is a tubular adenoma. This is not a cancer.  This is the type of polyp that sometimes will turn into one.      This polyp is out and will not cause you any further problems.      You should have another colonoscopy in 7 years to evaluate for other polyps.      Please let me know if you have any questions.       Thank you,    Ramón Pace MD

## 2024-04-09 DIAGNOSIS — E03.9 HYPOTHYROIDISM, UNSPECIFIED TYPE: ICD-10-CM

## 2024-04-09 RX ORDER — LEVOTHYROXINE SODIUM 200 UG/1
200 TABLET ORAL DAILY
Qty: 90 TABLET | Refills: 2 | Status: SHIPPED | OUTPATIENT
Start: 2024-04-09

## 2024-11-01 ENCOUNTER — OFFICE VISIT (OUTPATIENT)
Dept: FAMILY MEDICINE | Facility: CLINIC | Age: 59
End: 2024-11-01
Payer: COMMERCIAL

## 2024-11-01 VITALS
TEMPERATURE: 97.7 F | BODY MASS INDEX: 33.4 KG/M2 | RESPIRATION RATE: 16 BRPM | OXYGEN SATURATION: 96 % | HEIGHT: 66 IN | SYSTOLIC BLOOD PRESSURE: 117 MMHG | WEIGHT: 207.8 LBS | HEART RATE: 66 BPM | DIASTOLIC BLOOD PRESSURE: 79 MMHG

## 2024-11-01 DIAGNOSIS — N48.6 PEYRONIE'S SYNDROME: ICD-10-CM

## 2024-11-01 DIAGNOSIS — R22.9 LOCALIZED SKIN MASS, LUMP, OR SWELLING: ICD-10-CM

## 2024-11-01 DIAGNOSIS — Z91.89 H/O MODERATE SUN EXPOSURE: Primary | ICD-10-CM

## 2024-11-01 PROCEDURE — 88305 TISSUE EXAM BY PATHOLOGIST: CPT | Performed by: PATHOLOGY

## 2024-11-01 PROCEDURE — 11102 TANGNTL BX SKIN SINGLE LES: CPT | Mod: 59

## 2024-11-01 PROCEDURE — 99213 OFFICE O/P EST LOW 20 MIN: CPT | Mod: 25

## 2024-11-01 PROCEDURE — 11301 SHAVE SKIN LESION 0.6-1.0 CM: CPT

## 2024-11-01 RX ORDER — PENTOXIFYLLINE 400 MG/1
400 TABLET, EXTENDED RELEASE ORAL
Qty: 270 TABLET | Refills: 3 | Status: CANCELLED | OUTPATIENT
Start: 2024-11-01

## 2024-11-01 NOTE — PROGRESS NOTES
Assessment & Plan     Localized skin mass, lump, or swelling  - 6-10MM TRUNK  - Dermatological Path Order and Indications  - Dermatological Path Order and Indications  - AL TANGENTIAL BIOPSY OF SKIN, FIRST/SINGLE LESION    PROCEDURE: skin biopsy    PROCEDURE:   Shave Biopsy      After obtaining consent the area surrounding the skin lesion was prepared and draped in the usual sterile manner. The lesion was removed in the usual manner by the  biopsy method noted above. Hemostasis was assured. The patient tolerated  the procedure well.    Closure:    Bandage     Followup: The patient tolerated the procedure well without complications.  Standard post-procedure care is explained and return precautions are given. Sample was sent to path    H/O moderate sun exposure  Works as a   - Adult Dermatology  Referral      Follow up for routine annual as discussed or sooner with concerns     Subjective   Rony is a 59 year old, presenting for the following health issues:    Pt reports for new skin mas to L anterior chest. Noticed two months ago when scratching through shirt and felt pain. Does not recall mass prior to this. Notes that is not tender currently, but was significantly uncomfortable when first scratched. Bleeds easily when scratched, no other drainage. Getting caught on clothing and work safety harness. Wife is an RN and encouraged him to have it assessed. No other new lumps/bumps. Not growing. Non pruritic unless rubbing on clothing.     Of note- pt works as a  and has significant sun exposure. Has not previously had skin check.     Derm Problem (Spot on left upper chest area/Painful to the touch/Itch /Noticed about 2 months ago )      11/1/2024    10:16 AM   Additional Questions   Roomed by cirilo     History of Present Illness       Reason for visit:  Painful growth on upper left chest area between neck and shoulder. Looks like a wart but it's VERY painful if i scratch the area when it itches. It  "will itch some times and it's even throbbed uncomfortably.  Appeared suddenly 2 months ago  Symptom onset:  More than a month  Symptoms include:  Itchy, painful, sensitive  Symptom intensity:  Moderate  Symptom progression:  Staying the same  Had these symptoms before:  No  What makes it worse:  Scratch it and it hurts like crazy. Even if you scratch thru the shirt  What makes it better:  Yes, don't touch it   He is taking medications regularly.         Review of Systems  Constitutional, HEENT, cardiovascular, pulmonary, gi and gu systems are negative, except as otherwise noted.      Objective    /79   Pulse 66   Temp 97.7  F (36.5  C) (Temporal)   Resp 16   Ht 1.676 m (5' 6\")   Wt 94.3 kg (207 lb 12.8 oz)   SpO2 96%   BMI 33.54 kg/m    Body mass index is 33.54 kg/m .  Physical Exam  Chest:          Comments: mass       GENERAL: healthy, alert and no distress  EYES: Eyes grossly normal to inspection, PERRL and conjunctivae and sclerae normal  Neck: No visible JVD or lymphadenopathy   RESP: symmetrical rise in chest   CV: No peripheral edema notable   MS: no gross musculoskeletal defects noted  SKIN: 0.6cm hyperkeratotic lesion to L anterior chest. No surrounding erythema, edema, CDI.   PSYCH: mentation appears normal, affect normal/bright          Signed Electronically by: MARTINA Mendoza CNP  "

## 2024-11-05 LAB
PATH REPORT.COMMENTS IMP SPEC: NORMAL
PATH REPORT.COMMENTS IMP SPEC: NORMAL
PATH REPORT.FINAL DX SPEC: NORMAL
PATH REPORT.GROSS SPEC: NORMAL
PATH REPORT.MICROSCOPIC SPEC OTHER STN: NORMAL
PATH REPORT.RELEVANT HX SPEC: NORMAL

## 2024-11-07 ENCOUNTER — OFFICE VISIT (OUTPATIENT)
Dept: FAMILY MEDICINE | Facility: CLINIC | Age: 59
End: 2024-11-07
Payer: COMMERCIAL

## 2024-11-07 VITALS
BODY MASS INDEX: 33.37 KG/M2 | TEMPERATURE: 97.9 F | DIASTOLIC BLOOD PRESSURE: 83 MMHG | SYSTOLIC BLOOD PRESSURE: 127 MMHG | OXYGEN SATURATION: 97 % | WEIGHT: 207.6 LBS | RESPIRATION RATE: 16 BRPM | HEIGHT: 66 IN | HEART RATE: 61 BPM

## 2024-11-07 DIAGNOSIS — Z82.49 FAMILY HISTORY OF CHF (CONGESTIVE HEART FAILURE): ICD-10-CM

## 2024-11-07 DIAGNOSIS — D09.9 SQUAMOUS CELL CARCINOMA IN SITU: ICD-10-CM

## 2024-11-07 DIAGNOSIS — E03.9 HYPOTHYROIDISM, UNSPECIFIED TYPE: ICD-10-CM

## 2024-11-07 DIAGNOSIS — R00.2 PALPITATIONS: Primary | ICD-10-CM

## 2024-11-07 PROBLEM — J45.20 MILD INTERMITTENT ASTHMA, UNSPECIFIED WHETHER COMPLICATED: Status: ACTIVE | Noted: 2024-11-07

## 2024-11-07 LAB
ALBUMIN SERPL BCG-MCNC: 4.2 G/DL (ref 3.5–5.2)
ALP SERPL-CCNC: 138 U/L (ref 40–150)
ALT SERPL W P-5'-P-CCNC: 51 U/L (ref 0–70)
ANION GAP SERPL CALCULATED.3IONS-SCNC: 10 MMOL/L (ref 7–15)
AST SERPL W P-5'-P-CCNC: 34 U/L (ref 0–45)
BASOPHILS # BLD AUTO: 0.1 10E3/UL (ref 0–0.2)
BASOPHILS NFR BLD AUTO: 1 %
BILIRUB SERPL-MCNC: 0.3 MG/DL
BUN SERPL-MCNC: 17.2 MG/DL (ref 8–23)
CALCIUM SERPL-MCNC: 9.6 MG/DL (ref 8.8–10.4)
CHLORIDE SERPL-SCNC: 103 MMOL/L (ref 98–107)
CHOLEST SERPL-MCNC: 156 MG/DL
CREAT SERPL-MCNC: 1.14 MG/DL (ref 0.67–1.17)
EGFRCR SERPLBLD CKD-EPI 2021: 74 ML/MIN/1.73M2
EOSINOPHIL # BLD AUTO: 0.4 10E3/UL (ref 0–0.7)
EOSINOPHIL NFR BLD AUTO: 7 %
ERYTHROCYTE [DISTWIDTH] IN BLOOD BY AUTOMATED COUNT: 12.6 % (ref 10–15)
FASTING STATUS PATIENT QL REPORTED: ABNORMAL
FASTING STATUS PATIENT QL REPORTED: ABNORMAL
GLUCOSE SERPL-MCNC: 105 MG/DL (ref 70–99)
HCO3 SERPL-SCNC: 25 MMOL/L (ref 22–29)
HCT VFR BLD AUTO: 43.9 % (ref 40–53)
HDLC SERPL-MCNC: 47 MG/DL
HGB BLD-MCNC: 14.8 G/DL (ref 13.3–17.7)
IMM GRANULOCYTES # BLD: 0 10E3/UL
IMM GRANULOCYTES NFR BLD: 0 %
LDLC SERPL CALC-MCNC: 64 MG/DL
LYMPHOCYTES # BLD AUTO: 1.4 10E3/UL (ref 0.8–5.3)
LYMPHOCYTES NFR BLD AUTO: 20 %
MCH RBC QN AUTO: 31.6 PG (ref 26.5–33)
MCHC RBC AUTO-ENTMCNC: 33.7 G/DL (ref 31.5–36.5)
MCV RBC AUTO: 94 FL (ref 78–100)
MONOCYTES # BLD AUTO: 0.5 10E3/UL (ref 0–1.3)
MONOCYTES NFR BLD AUTO: 7 %
NEUTROPHILS # BLD AUTO: 4.3 10E3/UL (ref 1.6–8.3)
NEUTROPHILS NFR BLD AUTO: 65 %
NONHDLC SERPL-MCNC: 109 MG/DL
PLATELET # BLD AUTO: 231 10E3/UL (ref 150–450)
POTASSIUM SERPL-SCNC: 4.6 MMOL/L (ref 3.4–5.3)
PROT SERPL-MCNC: 6.8 G/DL (ref 6.4–8.3)
RBC # BLD AUTO: 4.68 10E6/UL (ref 4.4–5.9)
SODIUM SERPL-SCNC: 138 MMOL/L (ref 135–145)
TRIGL SERPL-MCNC: 226 MG/DL
TSH SERPL DL<=0.005 MIU/L-ACNC: 1.21 UIU/ML (ref 0.3–4.2)
VIT D+METAB SERPL-MCNC: 25 NG/ML (ref 20–50)
WBC # BLD AUTO: 6.6 10E3/UL (ref 4–11)

## 2024-11-07 PROCEDURE — 85025 COMPLETE CBC W/AUTO DIFF WBC: CPT

## 2024-11-07 PROCEDURE — 93000 ELECTROCARDIOGRAM COMPLETE: CPT

## 2024-11-07 PROCEDURE — 90750 HZV VACC RECOMBINANT IM: CPT

## 2024-11-07 PROCEDURE — 90471 IMMUNIZATION ADMIN: CPT

## 2024-11-07 PROCEDURE — 80061 LIPID PANEL: CPT

## 2024-11-07 PROCEDURE — 82306 VITAMIN D 25 HYDROXY: CPT

## 2024-11-07 PROCEDURE — 80053 COMPREHEN METABOLIC PANEL: CPT

## 2024-11-07 PROCEDURE — 84443 ASSAY THYROID STIM HORMONE: CPT

## 2024-11-07 PROCEDURE — 36415 COLL VENOUS BLD VENIPUNCTURE: CPT

## 2024-11-07 PROCEDURE — 99215 OFFICE O/P EST HI 40 MIN: CPT | Mod: 25

## 2024-11-07 ASSESSMENT — ANXIETY QUESTIONNAIRES
GAD7 TOTAL SCORE: 9
GAD7 TOTAL SCORE: 9
5. BEING SO RESTLESS THAT IT IS HARD TO SIT STILL: NEARLY EVERY DAY
2. NOT BEING ABLE TO STOP OR CONTROL WORRYING: NOT AT ALL
7. FEELING AFRAID AS IF SOMETHING AWFUL MIGHT HAPPEN: NOT AT ALL
3. WORRYING TOO MUCH ABOUT DIFFERENT THINGS: SEVERAL DAYS
6. BECOMING EASILY ANNOYED OR IRRITABLE: MORE THAN HALF THE DAYS
1. FEELING NERVOUS, ANXIOUS, OR ON EDGE: SEVERAL DAYS
IF YOU CHECKED OFF ANY PROBLEMS ON THIS QUESTIONNAIRE, HOW DIFFICULT HAVE THESE PROBLEMS MADE IT FOR YOU TO DO YOUR WORK, TAKE CARE OF THINGS AT HOME, OR GET ALONG WITH OTHER PEOPLE: SOMEWHAT DIFFICULT

## 2024-11-07 ASSESSMENT — PATIENT HEALTH QUESTIONNAIRE - PHQ9
SUM OF ALL RESPONSES TO PHQ QUESTIONS 1-9: 7
5. POOR APPETITE OR OVEREATING: MORE THAN HALF THE DAYS

## 2024-11-07 NOTE — PROGRESS NOTES
"  Assessment & Plan     Palpitations, chest pain, Family history of CHF (congestive heart failure)  - Echocardiogram Complete  - EKG 12-lead complete w/read - Clinics  - Comprehensive metabolic panel  - CBC with Platelets & Differential  - Vitamin D Deficiency  - Lipid panel reflex to direct LDL Non-fasting  - ZIO PATCH MAIL OUT  -symptom journal as discussed     Hypothyroidism, unspecified type  - TSH WITH FREE T4 REFLEX    Squamous cell carcinoma in situ  - Education provided  - Follow up with derm as discussed     Follow up pending diagnostics         Subjective   Rony is a 59 year old, presenting for the following health issues:    Pt reports for 1.5 year of intermittent chest pain. Describes as 'a little discomfort to my heart area'.   No known triggers, most commonly experiences while at rest or when thinking about life events (parents/sister passing, work, etc.). Reports it feels like his heart rate 'is speeding up' but remains regular. Reports mental health is doing well.     Denies- SOB, activity intolerance, edema, orthopnea, dizz, gerd, pain in inspiration  Endorse racing thoughts at night, some insomnia unless busy at work that day ()    Of note- Mother and sister  of CHF which was unexpected        2024     9:38 AM   PING-7 SCORE   Total Score 9          2024     9:38 AM   PHQ   PHQ-9 Total Score 7   Q9: Thoughts of better off dead/self-harm past 2 weeks Not at all         Follow Up (Chest discomfort )        2024     8:37 AM   Additional Questions   Roomed by cirilo           Review of Systems  Constitutional, HEENT, cardiovascular, pulmonary, gi and gu systems are negative, except as otherwise noted.      Objective    /83   Pulse 61   Temp 97.9  F (36.6  C) (Temporal)   Resp 16   Ht 1.676 m (5' 6\")   Wt 94.2 kg (207 lb 9.6 oz)   SpO2 97%   BMI 33.51 kg/m    Body mass index is 33.51 kg/m .  Physical Exam   GENERAL: alert and no distress  NECK: no adenopathy, no " asymmetry, masses, or scars  RESP: lungs clear to auscultation - no rales, rhonchi or wheezes  CV: regular rate and rhythm, normal S1 S2, no S3 or S4, no murmur, click or rub, no peripheral edema, +2DP pulses, brisk cap refill   MS: no gross musculoskeletal defects noted, no edema, chest wall non tender to palpation             Signed Electronically by: MARTINA Mendoza CNP  The longitudinal plan of care for the diagnosis(es)/condition(s) as documented were addressed during this visit. Due to the added complexity in care, I will continue to support Rony in the subsequent management and with ongoing continuity of care.  I personally spent a total of  >40  minutes on the day of the visit, excluding procedures. Please see note for details about time spent which includes:      pre-visit preparation    reviewing records    face to face time with the patient    timely documentation of the encounter    ordering medications/tests    communication with care team    care coordination.

## 2024-11-10 ENCOUNTER — MYC REFILL (OUTPATIENT)
Dept: FAMILY MEDICINE | Facility: CLINIC | Age: 59
End: 2024-11-10
Payer: COMMERCIAL

## 2024-11-10 DIAGNOSIS — N48.6 PEYRONIE'S SYNDROME: ICD-10-CM

## 2024-11-11 RX ORDER — PENTOXIFYLLINE 400 MG/1
400 TABLET, EXTENDED RELEASE ORAL
Qty: 270 TABLET | Refills: 3 | Status: SHIPPED | OUTPATIENT
Start: 2024-11-11

## 2024-11-14 ENCOUNTER — ORDERS ONLY (AUTO-RELEASED) (OUTPATIENT)
Dept: FAMILY MEDICINE | Facility: CLINIC | Age: 59
End: 2024-11-14
Payer: COMMERCIAL

## 2024-11-14 DIAGNOSIS — R00.2 PALPITATIONS: ICD-10-CM

## 2024-12-12 LAB — CV ZIO PRELIM RESULTS: NORMAL

## 2025-01-06 ENCOUNTER — OFFICE VISIT (OUTPATIENT)
Dept: DERMATOLOGY | Facility: CLINIC | Age: 60
End: 2025-01-06
Payer: COMMERCIAL

## 2025-01-06 DIAGNOSIS — Z12.83 SKIN CANCER SCREENING: Primary | ICD-10-CM

## 2025-01-06 DIAGNOSIS — L81.4 LENTIGO: ICD-10-CM

## 2025-01-06 DIAGNOSIS — D22.9 MULTIPLE NEVI: ICD-10-CM

## 2025-01-06 DIAGNOSIS — D18.01 CHERRY ANGIOMA: ICD-10-CM

## 2025-01-06 DIAGNOSIS — L73.8 SEBACEOUS HYPERPLASIA: ICD-10-CM

## 2025-01-06 DIAGNOSIS — D04.5 SQUAMOUS CELL CARCINOMA IN SITU OF SKIN OF CHEST: ICD-10-CM

## 2025-01-06 DIAGNOSIS — L82.1 SEBORRHEIC KERATOSIS: ICD-10-CM

## 2025-01-06 DIAGNOSIS — D23.9 DERMATOFIBROMA: ICD-10-CM

## 2025-01-06 PROCEDURE — 99203 OFFICE O/P NEW LOW 30 MIN: CPT | Performed by: STUDENT IN AN ORGANIZED HEALTH CARE EDUCATION/TRAINING PROGRAM

## 2025-01-06 NOTE — PROGRESS NOTES
[facility] Dermatology Note  Encounter Date: Jan 6, 2025  Office Visit     Reviewed patients past medical history and pertinent chart review prior to patients visit today.     Dermatology Problem List:  Last skin check: 1/6/2025  # History of NMSC  -SCCIS, left anterior chest, s/p shave biopsy 11/01/2024    Family Hx: Negative for family history of skin cancer.    Social Hx: Works in construction as a roof/  ____________________________________________    Assessment & Plan:     # Personal history of squamous cell carcinoma in situ, left anterior chest, biopsied on 11/01/2024  -Patient had a biopsy-proven squamous cell carcinoma in situ from the left anterior chest on 11/1/2024 in [facility] medicine. He did not have any further treatment of this lesion and was referred to dermatology. Diagnosis discussed. Ultimately, patient has healed well from the biopsy and there is no identifiable scar from his biopsy site. No prior photographs were obtained.  We discussed that ideally the area should be treated further via excision, ED&C, or topical Efudex to ensure no skin cancer cells remain. However, because I am unable to locate his scar, we discussed options of field treatment with Efudex to the general vicinity of the left chest vs continued observation. Ultimately patient elects to continue to observe the area.  - Advised to monitor for changing, non-healing, bleeding, painful, changing, or otherwise symptomatic lesions.  Should he have any recurrence, patient to return immediately for re-evaluation in dermatology.  - Sun protection: Counseled SPF 30+ sunscreen, UPF clothing, sun avoidance, tanning bed avoidance.    # Multiple nevi, trunk and extremities  # Solar lentigines  - Nevi demonstrate no concerning features on dermoscopy. We discussed the importance of self exams at home.   - ABCDEs: Counseled ABCDEs of melanoma: Asymmetry, Border (irregularity), Color (not uniform, changes in  color), Diameter (greater than 6 mm which is about the size of a pencil eraser), and Evolving (any changes in preexisting moles).    # Cherry angiomas  # Seborrheic keratoses  # Sebaceous hyperplasia  # Dermatofibromas  - We discussed the benign nature of the skin lesions. No treatment required. Continued observation recommended. Follow up with any concerns.      Follow-up:  Annual for follow up full body skin exam, as needed for new or changing lesions or new concerns    All risks, benefits and alternatives were discussed with patient.  Patient is in agreement and understands the assessment and plan.  All questions were answered.  Josefa Shane PA-C  [facility] Dermatology  ____________________________________________    CC: Skin Check (FBSC/No concerns)     HPI:  Mr. Alvarez Subramanian is a(n) 59 year old male who presents today as a new patient for a full body skin cancer screening. The patient has a history of a squamous cell carcinoma in situ that was biopsied by his PCP on 11/01/2024. This was not treated any further. He feels this has healed well. He is unsure where the scar is located.    Patient has no other specific cutaneous concerns today. No lesions that itch, bleed, or cause pain.     Patient is only sometimes diligent with photoprotection.  He notes a history of significant sun exposure. Patient is otherwise feeling well, without additional skin concerns.     Physical Exam:  Vitals: There were no vitals taken for this visit.   SKIN: Total skin excluding the genitalia areas was performed. The exam included the scalp, face, neck, bilateral arms, chest, back, abdomen, bilateral legs, digits, mons pubis, buttocks, and nails.   - Hawkins II.  -left anterior chest, unable to find scar from recent biopsy. No photos previously documented in his chart  -face, yellow to pink papule(s) with a central dell  -multiple tan/brown flat round macules and raised papules scattered throughout trunk, extremities,  and face. No worrisome features for malignancy noted on examination.  -scattered tan, homogenous macules scattered on sun exposed areas of trunk, extremities, and face  -few waxy, stuck on tan/brown papules and patches on the trunk and extremities  -few 1-2mm red dome shaped symmetric papules scattered on the trunk and extremities  -bilateral lower legs and left upper arm, firm, hyperpigmented papule that dimples upon manipulation     - No other lesions of concern on areas examined.     Medications:  Current Outpatient Medications   Medication Sig Dispense Refill    fluticasone-salmeterol (ADVAIR) 100-50 MCG/ACT inhaler Inhale 1 puff into the lungs 2 times daily as needed (shortness of breath) 60 each 1    IBUPROFEN PO       levothyroxine (SYNTHROID/LEVOTHROID) 200 MCG tablet TAKE ONE TABLET BY MOUTH ONCE DAILY 90 tablet 2    pentoxifylline ER (TRENTAL) 400 MG CR tablet Take 1 tablet (400 mg) by mouth 3 times daily (with meals). 270 tablet 3     No current facility-administered medications for this visit.      Past Medical History:   Patient Active Problem List   Diagnosis    Hypothyroidism, unspecified type    Peyronie's syndrome    Low testosterone in male    Family history of CHF (congestive heart failure)    Squamous cell carcinoma in situ    Mild intermittent asthma, unspecified whether complicated     Past Medical History:   Diagnosis Date    Low testosterone in male     Other specified hypothyroidism     Peyronie's syndrome        MARTINA Marin CNP  [address] [facility]  [place] [address] on close of this encounter.

## 2025-01-06 NOTE — LETTER
1/6/2025      Alvarez Subramanian  [address]  [facility] [address]      Dear Colleague,    Thank you for referring your patient, Alvarez Subramanian, to the [facility]. Please see a copy of my visit note below.    [facility] Dermatology Note  Encounter Date: Jan 6, 2025  Office Visit     Reviewed patients past medical history and pertinent chart review prior to patients visit today.     Dermatology Problem List:  Last skin check: 1/6/2025  # History of NMSC  -SCCIS, left anterior chest, s/p shave biopsy 11/01/2024    Family Hx: Negative for family history of skin cancer.    Social Hx: Works in construction as a roof/  ____________________________________________    Assessment & Plan:     # Personal history of squamous cell carcinoma in situ, left anterior chest, biopsied on 11/01/2024  -Patient had a biopsy-proven squamous cell carcinoma in situ from the left anterior chest on 11/1/2024 in family medicine. He did not have any further treatment of this lesion and was referred to dermatology. Diagnosis discussed. Ultimately, patient has healed well from the biopsy and there is no identifiable scar from his biopsy site. No prior photographs were obtained.  We discussed that ideally the area should be treated further via excision, ED&C, or topical Efudex to ensure no skin cancer cells remain. However, because I am unable to locate his scar, we discussed options of field treatment with Efudex to the general vicinity of the left chest vs continued observation. Ultimately patient elects to continue to observe the area.  - Advised to monitor for changing, non-healing, bleeding, painful, changing, or otherwise symptomatic lesions.  Should he have any recurrence, patient to return immediately for re-evaluation in dermatology.  - Sun protection: Counseled SPF 30+ sunscreen, UPF clothing, sun avoidance, tanning bed avoidance.    # Multiple nevi, trunk and  extremities  # Solar lentigines  - Nevi demonstrate no concerning features on dermoscopy. We discussed the importance of self exams at home.   - ABCDEs: Counseled ABCDEs of melanoma: Asymmetry, Border (irregularity), Color (not uniform, changes in color), Diameter (greater than 6 mm which is about the size of a pencil eraser), and Evolving (any changes in preexisting moles).    # Cherry angiomas  # Seborrheic keratoses  # Sebaceous hyperplasia  # Dermatofibromas  - We discussed the benign nature of the skin lesions. No treatment required. Continued observation recommended. Follow up with any concerns.      Follow-up:  Annual for follow up full body skin exam, as needed for new or changing lesions or new concerns    All risks, benefits and alternatives were discussed with patient.  Patient is in agreement and understands the assessment and plan.  All questions were answered.  Josefa Shane PA-C  [facility] Dermatology  ____________________________________________    CC: Skin Check (FBSC/No concerns)     HPI:  Mr. Alvarez Subramanian is a(n) 59 year old male who presents today as a new patient for a full body skin cancer screening. The patient has a history of a squamous cell carcinoma in situ that was biopsied by his PCP on 11/01/2024. This was not treated any further. He feels this has healed well. He is unsure where the scar is located.    Patient has no other specific cutaneous concerns today. No lesions that itch, bleed, or cause pain.     Patient is only sometimes diligent with photoprotection.  He notes a history of significant sun exposure. Patient is otherwise feeling well, without additional skin concerns.     Physical Exam:  Vitals: There were no vitals taken for this visit.   SKIN: Total skin excluding the genitalia areas was performed. The exam included the scalp, face, neck, bilateral arms, chest, back, abdomen, bilateral legs, digits, mons pubis, buttocks, and nails.   - Hawkins II.  -left anterior  chest, unable to find scar from recent biopsy. No photos previously documented in his chart  -face, yellow to pink papule(s) with a central dell  -multiple tan/brown flat round macules and raised papules scattered throughout trunk, extremities, and face. No worrisome features for malignancy noted on examination.  -scattered tan, homogenous macules scattered on sun exposed areas of trunk, extremities, and face  -few waxy, stuck on tan/brown papules and patches on the trunk and extremities  -few 1-2mm red dome shaped symmetric papules scattered on the trunk and extremities  -bilateral lower legs and left upper arm, firm, hyperpigmented papule that dimples upon manipulation     - No other lesions of concern on areas examined.     Medications:  Current Outpatient Medications   Medication Sig Dispense Refill     fluticasone-salmeterol (ADVAIR) 100-50 MCG/ACT inhaler Inhale 1 puff into the lungs 2 times daily as needed (shortness of breath) 60 each 1     IBUPROFEN PO        levothyroxine (SYNTHROID/LEVOTHROID) 200 MCG tablet TAKE ONE TABLET BY MOUTH ONCE DAILY 90 tablet 2     pentoxifylline ER (TRENTAL) 400 MG CR tablet Take 1 tablet (400 mg) by mouth 3 times daily (with meals). 270 tablet 3     No current facility-administered medications for this visit.      Past Medical History:   Patient Active Problem List   Diagnosis     Hypothyroidism, unspecified type     Peyronie's syndrome     Low testosterone in male     Family history of CHF (congestive heart failure)     Squamous cell carcinoma in situ     Mild intermittent asthma, unspecified whether complicated     Past Medical History:   Diagnosis Date     Low testosterone in male      Other specified hypothyroidism      Peyronie's syndrome        CC MARTINA Colin CNP  [address] [place] [address] on close of this encounter.      Again, thank you for allowing me to participate in the care of your patient.        Sincerely,        Deya Shane,  VARGHESE    Electronically signed

## 2025-01-06 NOTE — PATIENT INSTRUCTIONS
Patient Education       Proper skin care from [facility]:    -Eliminate harsh soaps as they strip the natural oils from the skin, often resulting in dry itchy skin ( i.e. Dial, Zest, Sinhala Spring)  -Use mild soaps such as Cetaphil or Dove Sensitive Skin in the shower. You do not need to use soap on arms, legs, and trunk every time you shower unless visibly soiled.   -Avoid hot or cold showers.  -After showering, lightly dry off and apply moisturizing within 2-3 minutes. This will help trap moisture in the skin.   -Aggressive use of a moisturizer at least 1-2 times a day to the entire body (including -Vanicream, Cetaphil, Aquaphor or Cerave) and moisturize hands after every washing.  -We recommend using moisturizers that come in a tub that needs to be scooped out, not a pump. This has more of an oil base. It will hold moisture in your skin much better than a water base moisturizer. The above recommended are non-pore clogging.      Wear a sunscreen with at least SPF 30 on your face, ears, neck and V of the chest daily. Wear sunscreen on other areas of the body if those areas are exposed to the sun throughout the day. Sunscreens can contain physical and/or chemical blockers. Physical blockers are less likely to clog pores, these include zinc oxide and titanium dioxide. Reapply every two hour and after swimming.     Sunscreen examples: https://www.ewg.org/sunscreen/    UV radiation  UVA radiation remains constant throughout the day and throughout the year. It is a longer wavelength than UVB and therefore penetrates deeper into the skin leading to immediate and delayed tanning, photoaging, and skin cancer. 70-80% of UVA and UVB radiation occurs between the hours of 10am-2pm.  UVB radiation  UVB radiation causes the most harmful effects and is more significant during the summer months. However, snow and ice can reflect UVB radiation leading to skin damage during the winter months as well. UVB radiation is  responsible for tanning, burning, inflammation, delayed erythema (pinkness), pigmentation (brown spots), and skin cancer.     I recommend self monthly full body exams and yearly full body exams with a dermatology provider. If you develop a new or changing lesion please follow up for examination. Most skin cancers are pink and scaly or pink and pearly. However, we do see blue/brown/black skin cancers.  Consider the ABCDEs of melanoma when giving yourself your monthly full body exam ( don't forget the groin, buttocks, feet, toes, etc). A-asymmetry, B-borders, C-color, D-diameter, E-elevation or evolving. If you see any of these changes please follow up in clinic. If you cannot see your back I recommend purchasing a hand held mirror to use with a larger wall mirror.       Checking for Skin Cancer  You can find cancer early by checking your skin each month. There are 3 kinds of skin cancer. They are melanoma, basal cell carcinoma, and squamous cell carcinoma. Doing monthly skin checks is the best way to find new marks or skin changes. Follow the instructions below for checking your skin.   The ABCDEs of checking moles for melanoma   Check your moles or growths for signs of melanoma using ABCDE:   Asymmetry: the sides of the mole or growth don t match  Border: the edges are ragged, notched, or blurred  Color: the color within the mole or growth varies  Diameter: the mole or growth is larger than 6 mm (size of a pencil eraser)  Evolving: the size, shape, or color of the mole or growth is changing (evolving is not shown in the images below)    Checking for other types of skin cancer  Basal cell carcinoma or squamous cell carcinoma have symptoms such as:     A spot or mole that looks different from all other marks on your skin  Changes in how an area feels, such as itching, tenderness, or pain  Changes in the skin's surface, such as oozing, bleeding, or scaliness  A sore that does not heal  New swelling or redness beyond  the border of a mole    Who s at risk?  Anyone can get skin cancer. But you are at greater risk if you have:   Fair skin, light-colored hair, or light-colored eyes  Many moles or abnormal moles on your skin  A history of sunburns from sunlight or tanning beds  A family history of skin cancer  A history of exposure to radiation or chemicals  A weakened immune system  If you have had skin cancer in the past, you are at risk for recurring skin cancer.   How to check your skin  Do your monthly skin checkups in front of a full-length mirror. Check all parts of your body, including your:   Head (ears, face, neck, and scalp)  Torso (front, back, and sides)  Arms (tops, undersides, upper, and lower armpits)  Hands (palms, backs, and fingers, including under the nails)  Buttocks and genitals  Legs (front, back, and sides)  Feet (tops, soles, toes, including under the nails, and between toes)  If you have a lot of moles, take digital photos of them each month. Make sure to take photos both up close and from a distance. These can help you see if any moles change over time.   Most skin changes are not cancer. But if you see any changes in your skin, call your doctor right away. Only he or she can diagnose a problem. If you have skin cancer, seeing your doctor can be the first step toward getting the treatment that could save your life.   [employer] last reviewed this educational content on 4/1/2019 2000-2020 [employer]. [address] [place] [address], [place] [address]. All rights reserved. This information is not intended as a substitute for professional medical care. Always follow your healthcare professional's instructions.       When should I call my doctor?  If you are worsening or not improving, please, contact us or seek urgent care as noted below.     Who should I call with questions (adults)?  [facility] (adult and pediatric): [phone]  [facility]  [place] (adult): [phone]  [facility] ([place], [place], [place] and [place]) [phone]  For urgent needs outside of business hours call the [facility] at [phone] and ask for the dermatology resident on call to be paged  If this is a medical emergency and you are unable to reach an ER, Call 911      If you need a prescription refill, please contact your pharmacy. Refills are approved or denied by our Physicians during normal business hours, Monday through Fridays  Per office policy, refills will not be granted if you have not been seen within the past year (or sooner depending on your child's condition

## 2025-02-17 DIAGNOSIS — E03.9 HYPOTHYROIDISM, UNSPECIFIED TYPE: ICD-10-CM

## 2025-02-17 RX ORDER — LEVOTHYROXINE SODIUM 200 UG/1
200 TABLET ORAL DAILY
Qty: 90 TABLET | Refills: 1 | OUTPATIENT
Start: 2025-02-17

## 2025-02-23 ENCOUNTER — HEALTH MAINTENANCE LETTER (OUTPATIENT)
Age: 60
End: 2025-02-23

## 2025-03-14 SDOH — HEALTH STABILITY: PHYSICAL HEALTH: ON AVERAGE, HOW MANY MINUTES DO YOU ENGAGE IN EXERCISE AT THIS LEVEL?: 100 MIN

## 2025-03-14 SDOH — HEALTH STABILITY: PHYSICAL HEALTH: ON AVERAGE, HOW MANY DAYS PER WEEK DO YOU ENGAGE IN MODERATE TO STRENUOUS EXERCISE (LIKE A BRISK WALK)?: 5 DAYS

## 2025-03-14 ASSESSMENT — ASTHMA QUESTIONNAIRES
QUESTION_1 LAST FOUR WEEKS HOW MUCH OF THE TIME DID YOUR ASTHMA KEEP YOU FROM GETTING AS MUCH DONE AT WORK, SCHOOL OR AT HOME: NONE OF THE TIME
ACT_TOTALSCORE: 18
ACT_TOTALSCORE: 18
QUESTION_2 LAST FOUR WEEKS HOW OFTEN HAVE YOU HAD SHORTNESS OF BREATH: ONCE A DAY
QUESTION_3 LAST FOUR WEEKS HOW OFTEN DID YOUR ASTHMA SYMPTOMS (WHEEZING, COUGHING, SHORTNESS OF BREATH, CHEST TIGHTNESS OR PAIN) WAKE YOU UP AT NIGHT OR EARLIER THAN USUAL IN THE MORNING: NOT AT ALL
QUESTION_4 LAST FOUR WEEKS HOW OFTEN HAVE YOU USED YOUR RESCUE INHALER OR NEBULIZER MEDICATION (SUCH AS ALBUTEROL): ONCE A WEEK OR LESS
QUESTION_5 LAST FOUR WEEKS HOW WOULD YOU RATE YOUR ASTHMA CONTROL: POORLY CONTROLLED

## 2025-03-14 ASSESSMENT — SOCIAL DETERMINANTS OF HEALTH (SDOH): HOW OFTEN DO YOU GET TOGETHER WITH FRIENDS OR RELATIVES?: ONCE A WEEK

## 2025-03-17 ENCOUNTER — OFFICE VISIT (OUTPATIENT)
Dept: FAMILY MEDICINE | Facility: CLINIC | Age: 60
End: 2025-03-17
Payer: COMMERCIAL

## 2025-03-17 VITALS
SYSTOLIC BLOOD PRESSURE: 133 MMHG | HEART RATE: 61 BPM | RESPIRATION RATE: 16 BRPM | WEIGHT: 214.2 LBS | DIASTOLIC BLOOD PRESSURE: 86 MMHG | OXYGEN SATURATION: 97 % | TEMPERATURE: 97.4 F | BODY MASS INDEX: 33.62 KG/M2 | HEIGHT: 67 IN

## 2025-03-17 DIAGNOSIS — Z00.00 ROUTINE GENERAL MEDICAL EXAMINATION AT A HEALTH CARE FACILITY: Primary | ICD-10-CM

## 2025-03-17 DIAGNOSIS — E03.9 HYPOTHYROIDISM, UNSPECIFIED TYPE: ICD-10-CM

## 2025-03-17 DIAGNOSIS — Z23 NEED FOR VACCINATION: ICD-10-CM

## 2025-03-17 DIAGNOSIS — J45.20 MILD INTERMITTENT ASTHMA WITHOUT COMPLICATION: ICD-10-CM

## 2025-03-17 PROCEDURE — 99396 PREV VISIT EST AGE 40-64: CPT | Mod: 25

## 2025-03-17 PROCEDURE — 90678 RSV VACC PREF BIVALENT IM: CPT

## 2025-03-17 PROCEDURE — 3079F DIAST BP 80-89 MM HG: CPT

## 2025-03-17 PROCEDURE — 90471 IMMUNIZATION ADMIN: CPT

## 2025-03-17 PROCEDURE — 90472 IMMUNIZATION ADMIN EACH ADD: CPT

## 2025-03-17 PROCEDURE — G2211 COMPLEX E/M VISIT ADD ON: HCPCS

## 2025-03-17 PROCEDURE — 99214 OFFICE O/P EST MOD 30 MIN: CPT | Mod: 25

## 2025-03-17 PROCEDURE — 90750 HZV VACC RECOMBINANT IM: CPT

## 2025-03-17 PROCEDURE — 90677 PCV20 VACCINE IM: CPT

## 2025-03-17 PROCEDURE — 3075F SYST BP GE 130 - 139MM HG: CPT

## 2025-03-17 RX ORDER — LEVOTHYROXINE SODIUM 200 UG/1
200 TABLET ORAL DAILY
Qty: 90 TABLET | Refills: 1 | Status: SHIPPED | OUTPATIENT
Start: 2025-03-17

## 2025-03-17 RX ORDER — FLUTICASONE PROPIONATE AND SALMETEROL 100; 50 UG/1; UG/1
1 POWDER RESPIRATORY (INHALATION) 2 TIMES DAILY PRN
Qty: 60 EACH | Refills: 3 | Status: SHIPPED | OUTPATIENT
Start: 2025-03-17

## 2025-03-17 ASSESSMENT — PATIENT HEALTH QUESTIONNAIRE - PHQ9: 5. POOR APPETITE OR OVEREATING: NEARLY EVERY DAY

## 2025-03-17 ASSESSMENT — ANXIETY QUESTIONNAIRES
3. WORRYING TOO MUCH ABOUT DIFFERENT THINGS: SEVERAL DAYS
7. FEELING AFRAID AS IF SOMETHING AWFUL MIGHT HAPPEN: NOT AT ALL
IF YOU CHECKED OFF ANY PROBLEMS ON THIS QUESTIONNAIRE, HOW DIFFICULT HAVE THESE PROBLEMS MADE IT FOR YOU TO DO YOUR WORK, TAKE CARE OF THINGS AT HOME, OR GET ALONG WITH OTHER PEOPLE: NOT DIFFICULT AT ALL
GAD7 TOTAL SCORE: 10
5. BEING SO RESTLESS THAT IT IS HARD TO SIT STILL: NEARLY EVERY DAY
6. BECOMING EASILY ANNOYED OR IRRITABLE: NEARLY EVERY DAY
GAD7 TOTAL SCORE: 10
2. NOT BEING ABLE TO STOP OR CONTROL WORRYING: NOT AT ALL
1. FEELING NERVOUS, ANXIOUS, OR ON EDGE: NOT AT ALL

## 2025-03-17 NOTE — LETTER
My Asthma Action Plan    Name: Alvarez Subramanian   YOB: 1965  Date: 3/17/2025   My doctor: MARTINA Mendoza CNP   My clinic: [facility]        My Control Medicine: Advair    My Asthma Severity:   Mild Persistent  Know your asthma triggers: smoke, upper respiratory infections, and mold               GREEN ZONE   Good Control  I feel good  No cough or wheeze  Can work, sleep and play without asthma symptoms       Take your asthma control medicine every day.     If exercise triggers your asthma, take your rescue medication  15 minutes before exercise or sports, and  During exercise if you have asthma symptoms  Spacer to use with inhaler: If you have a spacer, make sure to use it with your inhaler             YELLOW ZONE Getting Worse  I have ANY of these:  I do not feel good  Cough or wheeze  Chest feels tight  Wake up at night   Keep taking your Green Zone medications  Start taking your rescue medicine:  every 20 minutes for up to 1 hour. Then every 4 hours for 24-48 hours.  If you stay in the Yellow Zone for more than 12-24 hours, contact your doctor.  If you do not return to the Green Zone in 12-24 hours or you get worse, start taking your oral steroid medicine if prescribed by your provider.           RED ZONE Medical Alert - Get Help  I have ANY of these:  I feel awful  Medicine is not helping  Breathing getting harder  Trouble walking or talking  Nose opens wide to breathe       Take your rescue medicine NOW  If your provider has prescribed an oral steroid medicine, start taking it NOW  Call your doctor NOW  If you are still in the Red Zone after 20 minutes and you have not reached your doctor:  Take your rescue medicine again and  Call 911 or go to the emergency room right away    See your regular doctor within 2 weeks of an Emergency Room or Urgent Care visit for follow-up treatment.          Annual Reminders:  Meet with Asthma Educator,  Flu Shot in the Fall, consider  Pneumonia Vaccination for patients with asthma (aged 19 and older).    Pharmacy:    [id number] - [place], [place] - [address]  [facility] [place] [address]  [id number] - [place], [place] - [address] [facility] [place], [place] - [address]  [facility] [place], [place] - [address]    Electronically signed by MARTINA Mendoza CNP   Date: 03/17/25                      Asthma Triggers  How To Control Things That Make Your Asthma Worse    Triggers are things that make your asthma worse.  Look at the list below to help you find your triggers and what you can do about them.  You can help prevent asthma flare-ups by staying away from your triggers.      Trigger                                                          What you can do   Cigarette Smoke  Tobacco smoke can make asthma worse. Do not allow smoking in your home, car or around you.  Be sure no one smokes at a child s day care or school.  If you smoke, ask your health care provider for ways to help you quit.  Ask family members to quit too.  Ask your health care provider for a referral to Quit Plan to help you quit smoking, or call 5-483-814-PLAN.     Colds, Flu, Bronchitis  These are common triggers of asthma. Wash your hands often.  Don t touch your eyes, nose or mouth.  Get a flu shot every year.     Dust Mites  These are tiny bugs that live in cloth or carpet. They are too small to see. Wash sheets and blankets in hot water every week.   Encase pillows and mattress in dust mite proof covers.  Avoid having carpet if you can. If you have carpet, vacuum weekly.   Use a dust mask and HEPA vacuum.   Pollen and Outdoor Mold  Some people are allergic to trees, grass, or weed pollen, or molds. Try to keep your windows closed.  Limit time out doors when pollen count is high.   Ask you health care provider  about taking medicine during allergy season.     Animal Dander  Some people are allergic to skin flakes, urine or saliva from pets with fur or feathers. Keep pets with fur or feathers out of your home.    If you can t keep the pet outdoors, then keep the pet out of your bedroom.  Keep the bedroom door closed.  Keep pets off cloth furniture and away from stuffed toys.     Mice, Rats, and Cockroaches   Some people are allergic to the waste from these pests.   Cover food and garbage.  Clean up spills and food crumbs.  Store grease in the refrigerator.   Keep food out of the bedroom.   Indoor Mold  This can be a trigger if your home has high moisture. Fix leaking faucets, pipes, or other sources of water.   Clean moldy surfaces.  Dehumidify basement if it is damp and smelly.   Smoke, Strong Odors, and Sprays  These can reduce air quality. Stay away from strong odors and sprays, such as perfume, powder, hair spray, paints, smoke incense, paint, cleaning products, candles and new carpet.   Exercise or Sports  Some people with asthma have this trigger. Be active!  Ask your doctor about taking medicine before sports or exercise to prevent symptoms.    Warm up for 5-10 minutes before and after sports or exercise.     Other Triggers of Asthma  Cold air:  Cover your nose and mouth with a scarf.  Sometimes laughing or crying can be a trigger.  Some medicines and food can trigger asthma.

## 2025-03-17 NOTE — PATIENT INSTRUCTIONS
Patient Education   Preventive Care Advice   This is general advice given by our system to help you stay healthy. However, your care team may have specific advice just for you. Please talk to your care team about your preventive care needs.  Nutrition  Eat 5 or more servings of fruits and vegetables each day.  Try wheat bread, brown rice and whole grain pasta (instead of white bread, rice, and pasta).  Get enough calcium and vitamin D. Check the label on foods and aim for 100% of the RDA (recommended daily allowance).  Lifestyle  Exercise at least 150 minutes each week  (30 minutes a day, 5 days a week).  Do muscle strengthening activities 2 days a week. These help control your weight and prevent disease.  No smoking.  Wear sunscreen to prevent skin cancer.  Have a dental exam and cleaning every 6 months.  Yearly exams  See your health care team every year to talk about:  Any changes in your health.  Any medicines your care team has prescribed.  Preventive care, family planning, and ways to prevent chronic diseases.  Shots (vaccines)   HPV shots (up to age 26), if you've never had them before.  Hepatitis B shots (up to age 59), if you've never had them before.  COVID-19 shot: Get this shot when it's due.  Flu shot: Get a flu shot every year.  Tetanus shot: Get a tetanus shot every 10 years.  Pneumococcal, hepatitis A, and RSV shots: Ask your care team if you need these based on your risk.  Shingles shot (for age 50 and up)  General health tests  Diabetes screening:  Starting at age 35, Get screened for diabetes at least every 3 years.  If you are younger than age 35, ask your care team if you should be screened for diabetes.  Cholesterol test: At age 39, start having a cholesterol test every 5 years, or more often if advised.  Bone density scan (DEXA): At age 50, ask your care team if you should have this scan for osteoporosis (brittle bones).  Hepatitis C: Get tested at least once in your life.  STIs (sexually  transmitted infections)  Before age 24: Ask your care team if you should be screened for STIs.  After age 24: Get screened for STIs if you're at risk. You are at risk for STIs (including HIV) if:  You are sexually active with more than one person.  You don't use condoms every time.  You or a partner was diagnosed with a sexually transmitted infection.  If you are at risk for HIV, ask about PrEP medicine to prevent HIV.  Get tested for HIV at least once in your life, whether you are at risk for HIV or not.  Cancer screening tests  Cervical cancer screening: If you have a cervix, begin getting regular cervical cancer screening tests starting at age 21.  Breast cancer scan (mammogram): If you've ever had breasts, begin having regular mammograms starting at age 40. This is a scan to check for breast cancer.  Colon cancer screening: It is important to start screening for colon cancer at age 45.  Have a colonoscopy test every 10 years (or more often if you're at risk) Or, ask your provider about stool tests like a FIT test every year or Cologuard test every 3 years.  To learn more about your testing options, visit:   .  For help making a decision, visit:   https://bit.ly/hp88741.  Prostate cancer screening test: If you have a prostate, ask your care team if a prostate cancer screening test (PSA) at age 55 is right for you.  Lung cancer screening: If you are a current or former smoker ages 50 to 80, ask your care team if ongoing lung cancer screenings are right for you.  For informational purposes only. Not to replace the advice of your health care provider. Copyright   2023 [facility]. All rights reserved. Clinically reviewed by the [facility]. [employer] [id number] - REV 01/24.  Learning About Stress  What is stress?     Stress is your body's response to a hard situation. Your body can have a physical, emotional, or mental response. Stress is a fact of life for most people, and it  affects everyone differently. What causes stress for you may not be stressful for someone else.  A lot of things can cause stress. You may feel stress when you go on a job interview, take a test, or run a race. This kind of short-term stress is normal and even useful. It can help you if you need to work hard or react quickly. For example, stress can help you finish an important job on time.  Long-term stress is caused by ongoing stressful situations or events. Examples of long-term stress include long-term health problems, ongoing problems at work, or conflicts in your family. Long-term stress can harm your health.  How does stress affect your health?  When you are stressed, your body responds as though you are in danger. It makes hormones that speed up your heart, make you breathe faster, and give you a burst of energy. This is called the fight-or-flight stress response. If the stress is over quickly, your body goes back to normal and no harm is done.  But if stress happens too often or lasts too long, it can have bad effects. Long-term stress can make you more likely to get sick, and it can make symptoms of some diseases worse. If you tense up when you are stressed, you may develop neck, shoulder, or low back pain. Stress is linked to high blood pressure and heart disease.  Stress also harms your emotional health. It can make you liz, tense, or depressed. Your relationships may suffer, and you may not do well at work or school.  What can you do to manage stress?  You can try these things to help manage stress:   Do something active. Exercise or activity can help reduce stress. Walking is a great way to get started. Even everyday activities such as housecleaning or yard work can help.  Try yoga or mark chi. These techniques combine exercise and meditation. You may need some training at first to learn them.  Do something you enjoy. For example, listen to music or go to a movie. Practice your hobby or do volunteer  "work.  Meditate. This can help you relax, because you are not worrying about what happened before or what may happen in the future.  Do guided imagery. Imagine yourself in any setting that helps you feel calm. You can use online videos, books, or a teacher to guide you.  Do breathing exercises. For example:  From a standing position, bend forward from the waist with your knees slightly bent. Let your arms dangle close to the floor.  Breathe in slowly and deeply as you return to a standing position. Roll up slowly and lift your head last.  Hold your breath for just a few seconds in the standing position.  Breathe out slowly and bend forward from the waist.  Let your feelings out. Talk, laugh, cry, and express anger when you need to. Talking with supportive friends or family, a counselor, or a kira leader about your feelings is a healthy way to relieve stress. Avoid discussing your feelings with people who make you feel worse.  Write. It may help to write about things that are bothering you. This helps you find out how much stress you feel and what is causing it. When you know this, you can find better ways to cope.  What can you do to prevent stress?  You might try some of these things to help prevent stress:  Manage your time. This helps you find time to do the things you want and need to do.  Get enough sleep. Your body recovers from the stresses of the day while you are sleeping.  Get support. Your family, friends, and community can make a difference in how you experience stress.  Limit your news feed. Avoid or limit time on social media or news that may make you feel stressed.  Do something active. Exercise or activity can help reduce stress. Walking is a great way to get started.  Where can you learn more?  Go to https://www.[employer].com.net/patiented  Enter N032 in the search box to learn more about \"Learning About Stress.\"  Current as of: October 24, 2024  Content Version: 14.4 2024-2025 [employer].   Care instructions adapted under license by your healthcare professional. If you have questions about a medical condition or this instruction, always ask your healthcare professional. [employer] disclaims any warranty or liability for your use of this information.

## 2025-03-17 NOTE — PROGRESS NOTES
Preventive Care Visit  [facility]  MARTINA Mendoza CNP, Family Medicine  Mar 17, 2025      Assessment & Plan     Routine general medical examination at a health care facility    Mild intermittent asthma without complication  - fluticasone-salmeterol (ADVAIR) 100-50 MCG/ACT inhaler  Dispense: 60 each; Refill: 3    Hypothyroidism, unspecified type  - levothyroxine (SYNTHROID/LEVOTHROID) 200 MCG tablet  Dispense: 90 tablet; Refill: 1    Need for vaccination  - ZOSTER RECOMBINANT ADJUVANTED (SHINGRIX)  - Pneumococcal 20 Valent Conjugate (PCV20)  - RSV VACCINE (ABRYSVO)    Assessment & Plan  Asthma  Symptoms exacerbated by dust exposure. Improved with steroid inhaler. Prefers minimal medication. Control slightly below optimal but improving.  - Refill steroid inhaler prescription.  - Use inhaler as needed.    Depression and anxiety  Symptoms related to grief and loss. Mild elevation in depression and anxiety. Prefers non-medication management.  - Consider therapy or counseling if symptoms worsen.    Hypothyroidism  Well-managed on current Synthroid dose. Last labs normal.  - Refill Synthroid prescription.  - Check thyroid function tests in September.    Night sweats consistent with gustatory hyperhidrosis  Facial sweating and flushing after consuming large amounts of orange juice (only known trigger)   - treatment options reviewed    General Health Maintenance  Discussed age-appropriate vaccinations. Willing to receive recommended vaccines. Emphasized importance of shingles vaccine before age 65.  - Administer pneumonia, shingles, and RSV vaccines.    Follow-up  Recommended echocardiogram due to family history of heart issues.  - Order echocardiogram.  - Schedule follow-up appointment as needed.    I am utilizing AI dictation through [employer] to document the patient's history and physical examination. Please note that while the AI is designed to assist in capturing detailed information, there may be  "errors in the dictation. I will review and edit the content for accuracy before finalizing.        Counseling  Appropriate preventive services were addressed with this patient via screening, questionnaire, or discussion as appropriate for fall prevention, nutrition, physical activity, Tobacco-use cessation, social engagement, weight loss and cognition.  Checklist reviewing preventive services available has been given to the patient.  Reviewed patient's diet, addressing concerns and/or questions.   He is at risk for psychosocial distress and has been provided with information to reduce risk.           Subjective   Rony is a 60 year old, presenting for the following:  Physical (ANNUAL )    History of Present Illness  Rony Subramanian is a 60 year old male with asthma who presents for a routine follow-up visit.    Asthma symptoms have been manageable, but he experienced increased congestion after working in a basement with inadequate masks. He used cheaper masks without respirators, leading to congestion lasting about a month. He did not use his steroid inhaler during this time, which worsened his symptoms. He resumed using the inhaler yesterday and notes improvement. He estimates having about 20 doses left and requests a refill.    He experiences night sweats, particularly after consuming large amounts of orange juice before bed. This has happened multiple times, causing him to wake up drenched in sweat. He recalls an incident where his face was red after consuming orange juice the night before getting his 's license photo taken. No other known triggers. No other night sweats outside of this context. No weight loss.     He reflects on his emotional state, noting that he sometimes experiences \"heart pain\" when feeling depressed, particularly when thinking about his  family members. He does not express interest in pursuing therapy or medication for these feelings. No discomfort with exercise. Had had two 1st " degree relatives die from CHF of UNK origin, encouraged to complete previously prescribed echo.               3/17/2025     9:24 AM   Additional Questions   Roomed by CHRISTINA          11/7/2024     9:38 AM   PHQ   PHQ-9 Total Score 7   Q9: Thoughts of better off dead/self-harm past 2 weeks Not at all         11/7/2024     9:38 AM 3/17/2025     9:31 AM   PING-7 SCORE   Total Score 9 10         Advance Care Planning  Patient does not have a Health Care Directive: Discussed advance care planning with patient; however, patient declined at this time.      3/14/2025   General Health   How would you rate your overall physical health? Good   Feel stress (tense, anxious, or unable to sleep) To some extent   (!) STRESS CONCERN      3/14/2025   Nutrition   Three or more servings of calcium each day? Yes   Diet: Regular (no restrictions)   How many servings of fruit and vegetables per day? (!) 2-3   How many sweetened beverages each day? (!) 3         3/14/2025   Exercise   Days per week of moderate/strenous exercise 5 days   Average minutes spent exercising at this level 100 min         3/14/2025   Social Factors   Frequency of gathering with friends or relatives Once a week   Worry food won't last until get money to buy more No   Food not last or not have enough money for food? No   Do you have housing? (Housing is defined as stable permanent housing and does not include staying ouside in a car, in a tent, in an abandoned building, in an overnight shelter, or couch-surfing.) Yes   Are you worried about losing your housing? No   Lack of transportation? No   Unable to get utilities (heat,electricity)? No         3/14/2025   Fall Risk   Fallen 2 or more times in the past year? No   Trouble with walking or balance? No          3/14/2025   Dental   Dentist two times every year? Yes             3/14/2025   Substance Use   Alcohol more than 3/day or more than 7/wk No   Do you use any other substances recreationally? No     Social  "History     Tobacco Use    Smoking status: Former     Current packs/day: 0.00     Average packs/day: 1 pack/day for 20.0 years (20.0 ttl pk-yrs)     Types: Cigarettes     Start date: 5/15/1990     Quit date: 5/15/2000     Years since quittin.8    Smokeless tobacco: Never   Vaping Use    Vaping status: Never Used   Substance Use Topics    Alcohol use: Yes     Comment: 2 drinks/week    Drug use: Never         3/14/2025   STI Screening   New sexual partner(s) since last STI/HIV test? No   Last PSA: No results found for: \"PSA\"  ASCVD Risk   The 10-year ASCVD risk score (Nellie CODY, et al., 2019) is: 7.7%    Values used to calculate the score:      Age: 60 years      Sex: Male      Is Non- : No      Diabetic: No      Tobacco smoker: No      Systolic Blood Pressure: 133 mmHg      Is BP treated: No      HDL Cholesterol: 47 mg/dL      Total Cholesterol: 156 mg/dL         Objective    Exam  /86   Pulse 61   Temp 97.4  F (36.3  C) (Temporal)   Resp 16   Ht 1.71 m (5' 7.32\")   Wt 97.2 kg (214 lb 3.2 oz)   SpO2 97%   BMI 33.23 kg/m     Estimated body mass index is 33.23 kg/m  as calculated from the following:    Height as of this encounter: 1.71 m (5' 7.32\").    Weight as of this encounter: 97.2 kg (214 lb 3.2 oz).    Physical Exam  GENERAL: alert and no distress  NECK: no adenopathy, no asymmetry, masses, or scars  RESP: lungs clear to auscultation - no rales, rhonchi or wheezes  CV: regular rate and rhythm, normal S1 S2, no S3 or S4, no murmur, click or rub, no peripheral edema  MS: no gross musculoskeletal defects noted, no edema        Signed Electronically by: MARTINA Mendoza CNP  The longitudinal plan of care for the diagnosis(es)/condition(s) as documented were addressed during this visit. Due to the added complexity in care, I will continue to support Rony in the subsequent management and with ongoing continuity of care.    "

## 2025-03-19 ENCOUNTER — OFFICE VISIT (OUTPATIENT)
Dept: UROLOGY | Facility: CLINIC | Age: 60
End: 2025-03-19
Payer: COMMERCIAL

## 2025-03-19 DIAGNOSIS — N48.6 PEYRONIE'S DISEASE: ICD-10-CM

## 2025-03-19 DIAGNOSIS — R68.82 REDUCED LIBIDO: Primary | ICD-10-CM

## 2025-03-19 LAB
LH SERPL-ACNC: 8.4 MIU/ML (ref 1.7–8.6)
PROLACTIN SERPL 3RD IS-MCNC: 5 NG/ML (ref 4–15)
SHBG SERPL-SCNC: 22 NMOL/L (ref 11–80)

## 2025-03-19 NOTE — NURSING NOTE
Alvarez Subramanian's goals for this visit include:   Chief Complaint   Patient presents with    New Patient     Peyronie's Disease       He requests these members of his care team be copied on today's visit information:       PCP: Soni Francis    Referring Provider:  Referred Self, MD  No address on file    There were no vitals taken for this visit.    Do you need any medication refills at today's visit?     Jia De Leon LPN on 3/19/2025 at 1:02 PM

## 2025-03-19 NOTE — PROGRESS NOTES
I am seeing Alvarez Subramanian in consultation for evaluation of Peyronie's disease.      HPI:  Alvarez Subramanian is a 60 year old male with history of severe Peyronie's disease.    Onset: several year ago.  He had tried pentoxifylline TID in the past.  Libido was OK on pentoxifylline.  Inciting trauma:  nothing definite injury that he recalls.  Severity/Degree of curve: 45-60 degree, roughly.  ED: having trouble maintaining an erection.  Some of this is performance anxiety he thinks.  Dupuytren's contractures: No    Pain with erection:   Degree of curve precludes intercourse:  yes, not able for intercourse.  Libido is poor, somewhat due to anxiety.  History of low testosterone many years ago.  Had tried Testopel years ago.  No recent T check.      PAST MEDICAL HX:  Past Medical History:   Diagnosis Date    Arthritis     Depressive disorder     Low testosterone in male     Other specified hypothyroidism     Peyronie's syndrome     Uncomplicated asthma 2019    The day after my mom , i couldn't breath the next day. May have caught something in hospital. Took steroids for 2 weeks to clear up. Never been able to breath the same since       PAST SURG HX:  Past Surgical History:   Procedure Laterality Date    COLONOSCOPY N/A 3/27/2024    Procedure: COLONOSCOPY, FLEXIBLE, WITH LESION REMOVAL USING SNARE;  Surgeon: Ramón Pace MD;  Location: [facility] OR    COLONOSCOPY WITH CO2 INSUFFLATION N/A 3/27/2024    Procedure: Colonoscopy with CO2 insufflation;  Surgeon: Ramón Pace MD;  Location: [facility] OR    HERNIA REPAIR Bilateral 1968        FAMILY HX:  Family History   Problem Relation Age of Onset    Diabetes Type 2  Mother     Diabetes Mother     Coronary Artery Disease Mother     Parkinsonism Father        SOCIAL HX:  Social History     Tobacco Use    Smoking status: Former     Current packs/day: 0.00     Average packs/day: 1 pack/day for 20.0 years (20.0 ttl pk-yrs)     Types: Cigarettes     Start date:  5/15/1990     Quit date: 5/15/2000     Years since quittin.8    Smokeless tobacco: Never   Vaping Use    Vaping status: Never Used   Substance Use Topics    Alcohol use: Yes     Comment: 2 drinks/week    Drug use: Never       MEDICATIONS:  Current Outpatient Medications   Medication Sig Dispense Refill    fluticasone-salmeterol (ADVAIR) 100-50 MCG/ACT inhaler Inhale 1 puff into the lungs 2 times daily as needed (shortness of breath). 60 each 3    IBUPROFEN PO       levothyroxine (SYNTHROID/LEVOTHROID) 200 MCG tablet Take 1 tablet (200 mcg) by mouth daily. 90 tablet 1    pentoxifylline ER (TRENTAL) 400 MG CR tablet Take 1 tablet (400 mg) by mouth 3 times daily (with meals). 270 tablet 3     No current facility-administered medications for this visit.       ALLERGIES:  Patient has no known allergies.      GENERAL PHYSICAL EXAM:   Constitutional: No acute distress. Well nourished.   PSYCH: normal mood and affect.  NEURO: normal gait, no focal deficits.   EYES: anicteric, EOMI, PERR.  ENT: neck supple, no lymphadenopathy, mucosae moist, no thrush.  CARDIOPULMONARY: breathing non-labored, pulse regular rate/rhythm, no peripheral edema.  GI: Abdomen soft, nondistended   MUSCULOSKELETAL: normal limb proportions, no muscle wasting, no contractures.  SKIN: Normal virilized hair distribution, no lesions, warts or rashes over genitalia, abdomen extremities or face.  HEME/LYMPH: no ecchymosis, no lymphadenopathy in groin or neck, no lymphedema.     EXAM:  Phallus  circumcised, meatus adequate, dense dorsal septal plaque palpated down the entire dorsum  Left testis descended ,   Right testis descended ,    Prostate exam: deferred     Imaging/labs:  N/A       ASSESSMENT:   Peyronie's disease with curvature, erectile dysfunction.    Severe dorsal curve, about 60deg.  History of hypogonadism on Testopel but didn't follow-up.  Low libido        PLAN:  Peyronie's disease.  - discussed conservative management: rest, NSAIDS,  Pentoxifylline.  - discussed mechanical therapies of Xiaflex injections  +/- traction device.  - discussed surgical therapies including plication, patch graft, penile prosthesis as a last resort.      He wants to try RestoreX penile traction device now.  Will call for Xiaflex injections in future if desired.  Declines surgery at this time.    I'm happy to see him back in the future as needed.       Thank-you,  Brett Barboza MD     Urological Surgeon       --------------------------------------------------------------------------------------------------------------------   Additional Coding Information:    Problems:  4- 2 or more chronic stable issues    Data Reviewed  N/A     Tests ordered/pending: 3 labs ordered     Level of risk:  4 -- prescription drug management discussed ( Xiaflex injection discussed )    Time spent:  22  minutes spent on the date of the encounter doing chart review, history and exam, documentation and further activities per the note

## 2025-08-24 ENCOUNTER — MYC MEDICAL ADVICE (OUTPATIENT)
Dept: FAMILY MEDICINE | Facility: CLINIC | Age: 60
End: 2025-08-24
Payer: COMMERCIAL

## 2025-08-24 DIAGNOSIS — E03.9 HYPOTHYROIDISM, UNSPECIFIED TYPE: ICD-10-CM

## 2025-08-25 RX ORDER — LEVOTHYROXINE SODIUM 200 UG/1
200 TABLET ORAL DAILY
Qty: 30 TABLET | Refills: 0 | Status: SHIPPED | OUTPATIENT
Start: 2025-08-25

## (undated) DEVICE — ENDO CATH ESOPH BALLOON 08-9-10MMX180CM CRE FIXED WIRE

## (undated) DEVICE — SPECIMEN TRAP VACUUM SUCTION 003984-901

## (undated) DEVICE — ENDO CATH ESOPH/PYL/COLONIC BALLOON 10-11-12MMX240CM CRE

## (undated) DEVICE — ENDO MARKER SPOT 5ML

## (undated) DEVICE — SPECIMEN TRAP 80ML BUSSE BW407

## (undated) DEVICE — SYR 50ML SLIP TIP W/O NDL 309654

## (undated) DEVICE — SUCTION CANISTER MEDIVAC LINER 1500ML W/LID 65651-515

## (undated) DEVICE — CUP DENTURE 7.5OZ GOLD DISP

## (undated) DEVICE — ENDO FORCEP ENDOJAW BIOPSY 2.0MMX155CM FB-221K

## (undated) DEVICE — ENDO CATH ESOPH/PYL/COLONIC BALLOON 15-16.5-18MMX240CM CRE

## (undated) DEVICE — SWAB PROCTO 16" NON-STERILE

## (undated) DEVICE — ENDO CATH ESOPH BALLOON 18-19-20MMX180CM CRE FIXED WIRE

## (undated) DEVICE — SUCTION TIP YANKAUER W/O VENT K86

## (undated) DEVICE — ENDO CATH ESOPH BALLOON 12-13.5-15MMX180CM CRE FIXED WIRE

## (undated) DEVICE — ENDO CATH ESOPH/PYL/COLONIC BALLOON 18-19-20MMX240CM CRE

## (undated) DEVICE — ENDO CATH ESOPH BALLOON 06-7-8MMX180CM CRE FIXED WIRE

## (undated) DEVICE — ENDO FORCEP ENDOJAW BIOPSY 2.8MMX230CM FB-220U

## (undated) DEVICE — DEVICE RETRIEVAL ROTH NET PLATINUM UNIV 2.5MMX230CM 00715050

## (undated) DEVICE — ENDO CATH ESOPH/PYL/COLONIC BALLOON 08-9-10MMX240CM CRE

## (undated) DEVICE — SYR 50ML CATH TIP W/O NDL 309620

## (undated) DEVICE — SPECIMEN CONTAINER 60MLW/10% FORMALIN 59601

## (undated) DEVICE — ENDO CATH ESOPH/PYL/COLONIC BALLOON 12-13.5-15MMX240CM CRE

## (undated) DEVICE — ENDO SNARE OVAL 2.5X4.0CM W/25GA NDL

## (undated) DEVICE — ENDO CATH ESOPH BALLOON 15-16.5-18MMX180CM CRE FIXED WIRE

## (undated) DEVICE — SYR INFLATOR AND GAUGE 60ML M00550601

## (undated) DEVICE — ESU ERBE FIAPC PROBE 2.3MMX220CM

## (undated) DEVICE — Device

## (undated) DEVICE — SOL WATER IRRIG 1000ML BOTTLE 07139-09

## (undated) DEVICE — ENDO CATH ESOPH BALLOON 10-11-12MMX180CM CRE FIXED WIRE

## (undated) RX ORDER — SIMETHICONE 40MG/0.6ML
SUSPENSION, DROPS(FINAL DOSAGE FORM)(ML) ORAL
Status: DISPENSED
Start: 2024-03-27

## (undated) RX ORDER — ONDANSETRON 2 MG/ML
INJECTION INTRAMUSCULAR; INTRAVENOUS
Status: DISPENSED
Start: 2024-03-27

## (undated) RX ORDER — FENTANYL CITRATE 50 UG/ML
INJECTION, SOLUTION INTRAMUSCULAR; INTRAVENOUS
Status: DISPENSED
Start: 2024-03-27